# Patient Record
Sex: FEMALE | Race: WHITE | NOT HISPANIC OR LATINO | Employment: FULL TIME | URBAN - METROPOLITAN AREA
[De-identification: names, ages, dates, MRNs, and addresses within clinical notes are randomized per-mention and may not be internally consistent; named-entity substitution may affect disease eponyms.]

---

## 2023-11-30 ENCOUNTER — PHARMACY VISIT (OUTPATIENT)
Dept: PHARMACY | Facility: CLINIC | Age: 53
End: 2023-11-30
Payer: COMMERCIAL

## 2023-11-30 PROCEDURE — RXMED WILLOW AMBULATORY MEDICATION CHARGE

## 2024-01-18 ENCOUNTER — APPOINTMENT (OUTPATIENT)
Dept: UROLOGY | Facility: CLINIC | Age: 54
End: 2024-01-18
Payer: COMMERCIAL

## 2024-02-20 ENCOUNTER — TELEPHONE (OUTPATIENT)
Dept: PRIMARY CARE | Facility: CLINIC | Age: 54
End: 2024-02-20

## 2024-02-20 RX ORDER — VENLAFAXINE HYDROCHLORIDE 75 MG/1
75 CAPSULE, EXTENDED RELEASE ORAL DAILY
Qty: 90 CAPSULE | Refills: 3 | Status: CANCELLED | OUTPATIENT
Start: 2024-02-20 | End: 2025-02-19

## 2024-03-04 DIAGNOSIS — F32.A DEPRESSION, UNSPECIFIED DEPRESSION TYPE: ICD-10-CM

## 2024-03-04 PROCEDURE — RXMED WILLOW AMBULATORY MEDICATION CHARGE

## 2024-03-04 RX ORDER — VENLAFAXINE HYDROCHLORIDE 75 MG/1
75 CAPSULE, EXTENDED RELEASE ORAL DAILY
Qty: 90 CAPSULE | Refills: 3 | Status: SHIPPED | OUTPATIENT
Start: 2024-03-04 | End: 2024-06-05 | Stop reason: SDUPTHER

## 2024-03-05 ENCOUNTER — PHARMACY VISIT (OUTPATIENT)
Dept: PHARMACY | Facility: CLINIC | Age: 54
End: 2024-03-05
Payer: COMMERCIAL

## 2024-04-09 DIAGNOSIS — Z00.6 RESEARCH STUDY PATIENT: Primary | ICD-10-CM

## 2024-04-16 ENCOUNTER — APPOINTMENT (OUTPATIENT)
Dept: LAB | Facility: LAB | Age: 54
End: 2024-04-16
Payer: COMMERCIAL

## 2024-05-16 ENCOUNTER — TELEMEDICINE (OUTPATIENT)
Dept: PRIMARY CARE | Facility: CLINIC | Age: 54
End: 2024-05-16
Payer: COMMERCIAL

## 2024-05-16 DIAGNOSIS — M54.2 NECK PAIN: Primary | ICD-10-CM

## 2024-05-16 PROCEDURE — 99213 OFFICE O/P EST LOW 20 MIN: CPT | Performed by: INTERNAL MEDICINE

## 2024-05-16 PROCEDURE — 1036F TOBACCO NON-USER: CPT | Performed by: INTERNAL MEDICINE

## 2024-05-16 RX ORDER — NAPROXEN 500 MG/1
TABLET ORAL
Qty: 60 TABLET | Refills: 0 | Status: SHIPPED | OUTPATIENT
Start: 2024-05-16 | End: 2024-06-05 | Stop reason: WASHOUT

## 2024-05-16 RX ORDER — ACETAMINOPHEN 500 MG
1 TABLET ORAL DAILY
COMMUNITY

## 2024-05-16 RX ORDER — ESTRADIOL 0.1 MG/G
2 CREAM VAGINAL 3 TIMES WEEKLY
COMMUNITY
Start: 2022-05-20

## 2024-05-16 RX ORDER — CYCLOBENZAPRINE HCL 5 MG
TABLET ORAL
Qty: 30 TABLET | Refills: 0 | Status: SHIPPED | OUTPATIENT
Start: 2024-05-16 | End: 2024-06-05 | Stop reason: WASHOUT

## 2024-05-16 RX ORDER — MAGNESIUM GLUCONATE 27.5 (500)
1 TABLET ORAL DAILY
COMMUNITY

## 2024-05-16 RX ORDER — MAGNESIUM 200 MG
1 TABLET ORAL DAILY
COMMUNITY
Start: 2021-10-19

## 2024-05-16 RX ORDER — FLUTICASONE PROPIONATE 50 MCG
1 SPRAY, SUSPENSION (ML) NASAL DAILY
COMMUNITY
Start: 2020-12-30 | End: 2024-06-05 | Stop reason: SDUPTHER

## 2024-05-16 RX ORDER — IBUPROFEN 100 MG/5ML
1 SUSPENSION, ORAL (FINAL DOSE FORM) ORAL DAILY
COMMUNITY

## 2024-05-16 RX ORDER — CLOBETASOL PROPIONATE 0.5 MG/G
OINTMENT TOPICAL 2 TIMES DAILY
COMMUNITY
Start: 2022-05-25 | End: 2024-06-05 | Stop reason: SDUPTHER

## 2024-05-16 NOTE — PATIENT INSTRUCTIONS
Good to speak with you by phone today  Unclear exactly what is going on with your neck.  To me sounds like musculoskeletal in nature.    Lets start with a plain film of the neck.  Trial of naproxen 500 mg BID x 5 days (more like every 12 hours) plus cyclobenzaprine 5-10 mg only at bedtime as too sedating.    Referral to PT.  I like the [facility] for [facility].    If you are not improving would like to examine in person on 06/05/24 at [place] at 8:30 AM

## 2024-05-16 NOTE — PROGRESS NOTES
Subjective   Patient ID:   [id number]   [id number]   Radha Tellez is a 53 y.o. female who presents for urgent phone visit.  Follow-up (Patient having pain in neck since Dec and not getting better).  HPI    53 year old female noting in December she was watching a movie and fell asleep with her neck in a weird position. She used heat, NSAIDS and she still has pain turning her head to the left. If she keeps her neck neutral it does not hurt. She denies any neurologic symptoms.  She has not had any headaches.  No weakness. No numbness.  Using ibuprofen 2 every few days with relief.    ROS were reviewed and are negative with the exception of what is noted in HPI    There were no vitals taken for this visit.  Objective   Physical Exam  Phone call so did not see her.  Speech fluent.      Assessment/Plan   Problem List Items Addressed This Visit    None  #neck pain- unclear exactly but from her description sounds more like muscular in nature. A bit of a challenge with a phone call although she is an amazing historian.  She describes the pain along the posterior cervical spine around C4 level and there is some tenderness lateral to the actual spine in the left side.  Will start with plain film, trial of naproxen 500 mg BID x 5 days and cyclobenzaprine 5-10 mg at bedtime.  Also referral to [facility] physical therapy to help with strain.  If she is not better would like to see her in person to clarify her exam further. We agree to see each other Wednesday June 5th at 8:30 AM.     NOT ADDRESSED TODAY  1. Breast cancer - originally diagnosed 2005 s/p adriamycin and cytoxan chemotherapy. s/p mastectomy bilaterally. BRCA negative . Follows up with survivor clinic, [facility].   2. 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 Depression, anxiety - stable on Effexor 75 mg BID. No changes at this time. LEngthy discussion  today about her coping strategies and she is going back to therapist for support and ideas.   4. Osteopenia, osteoporosis - alendronate started 2013, repeat bone densitometry December 2015 with slight progression. Consider future referral to endocrine. Stopped alendronate 5/18 as has completed 5 years. Encourage healthy lifestyle. Bone density 11//21 showed improvement in density. Repeat 11/23. COntinue good habits.   5. Palpitation episode - infrequent and feels like she can valsalva and it goes away.   6. Allergic Rhinitis -nasal steroids and antihistamines PRN. Consider Haig T in the future. This is a major issue and made worse by the dog. Fluticasone used regularly.   7. Vaginal bleeding and urine symptoms, urethral caruncle and lichen sclerosis identified by GYN - unable to differentiate if vaginal or urethral, appreciate GYN input, s/p hysterectomy.  8. Nephrolithiasis   9. Moles - derm told her moles do not look worrisome so annual visit unnecessary   10. Malodorous stools - still an issue but takes omega fish oil  11. Burp - still with burping  12. Hair thinning - no clear source and she suspects related to aging and hormone changes.   13. Vulvovaginal atrophy - okay with KY jelly. Vagifem did help but generic version with unpleasant odor so stopped and has not been as bad.   14. Wrist fracture - overall better  15. Health maintenance - she has had SHARON and BSO, current immunizations, shingrix vaccination series completed, colonoscopy due 2024, goes to cancer survivor clinic, Had COVID vaccinations and COVID in 4/22   Mikaela Rudolph MD

## 2024-05-28 PROCEDURE — RXMED WILLOW AMBULATORY MEDICATION CHARGE

## 2024-05-31 ENCOUNTER — PHARMACY VISIT (OUTPATIENT)
Dept: PHARMACY | Facility: CLINIC | Age: 54
End: 2024-05-31
Payer: COMMERCIAL

## 2024-06-05 ENCOUNTER — OFFICE VISIT (OUTPATIENT)
Dept: PRIMARY CARE | Facility: CLINIC | Age: 54
End: 2024-06-05
Payer: COMMERCIAL

## 2024-06-05 ENCOUNTER — LAB (OUTPATIENT)
Dept: LAB | Facility: LAB | Age: 54
End: 2024-06-05
Payer: COMMERCIAL

## 2024-06-05 ENCOUNTER — PHARMACY VISIT (OUTPATIENT)
Dept: PHARMACY | Facility: CLINIC | Age: 54
End: 2024-06-05
Payer: COMMERCIAL

## 2024-06-05 VITALS
SYSTOLIC BLOOD PRESSURE: 100 MMHG | BODY MASS INDEX: 21.77 KG/M2 | DIASTOLIC BLOOD PRESSURE: 64 MMHG | WEIGHT: 139 LBS | HEART RATE: 66 BPM

## 2024-06-05 DIAGNOSIS — C50.919 MALIGNANT NEOPLASM OF FEMALE BREAST, UNSPECIFIED ESTROGEN RECEPTOR STATUS, UNSPECIFIED LATERALITY, UNSPECIFIED SITE OF BREAST (MULTI): ICD-10-CM

## 2024-06-05 DIAGNOSIS — M81.0 OSTEOPOROSIS WITHOUT CURRENT PATHOLOGICAL FRACTURE, UNSPECIFIED OSTEOPOROSIS TYPE: ICD-10-CM

## 2024-06-05 DIAGNOSIS — Z00.00 ANNUAL PHYSICAL EXAM: ICD-10-CM

## 2024-06-05 DIAGNOSIS — L65.9 ALOPECIA: ICD-10-CM

## 2024-06-05 DIAGNOSIS — Z78.9 VEGETARIAN: ICD-10-CM

## 2024-06-05 DIAGNOSIS — F32.A DEPRESSION, UNSPECIFIED DEPRESSION TYPE: ICD-10-CM

## 2024-06-05 DIAGNOSIS — J30.9 ALLERGIC RHINITIS, UNSPECIFIED SEASONALITY, UNSPECIFIED TRIGGER: ICD-10-CM

## 2024-06-05 DIAGNOSIS — K21.9 GASTROESOPHAGEAL REFLUX DISEASE, UNSPECIFIED WHETHER ESOPHAGITIS PRESENT: ICD-10-CM

## 2024-06-05 DIAGNOSIS — Z86.010 HISTORY OF COLONIC POLYPS: ICD-10-CM

## 2024-06-05 DIAGNOSIS — G47.00 INSOMNIA, UNSPECIFIED TYPE: Primary | ICD-10-CM

## 2024-06-05 DIAGNOSIS — C18.9 MALIGNANT NEOPLASM OF COLON, UNSPECIFIED PART OF COLON (MULTI): ICD-10-CM

## 2024-06-05 DIAGNOSIS — N90.4 LICHEN SCLEROSUS OF VULVA: ICD-10-CM

## 2024-06-05 DIAGNOSIS — Z12.11 COLON CANCER SCREENING: ICD-10-CM

## 2024-06-05 DIAGNOSIS — F41.9 ANXIETY: ICD-10-CM

## 2024-06-05 DIAGNOSIS — N95.2 VAGINAL ATROPHY: ICD-10-CM

## 2024-06-05 DIAGNOSIS — L90.0 LICHEN SCLEROSUS: ICD-10-CM

## 2024-06-05 PROBLEM — S52.501S: Status: ACTIVE | Noted: 2024-06-05

## 2024-06-05 PROBLEM — Z86.0100 HISTORY OF COLONIC POLYPS: Status: ACTIVE | Noted: 2024-06-05

## 2024-06-05 PROBLEM — H52.10 MYOPIA: Status: ACTIVE | Noted: 2024-06-05

## 2024-06-05 PROBLEM — N90.89 VULVAR IRRITATION: Status: ACTIVE | Noted: 2024-06-05

## 2024-06-05 PROBLEM — G56.10: Status: ACTIVE | Noted: 2024-06-05

## 2024-06-05 LAB
25(OH)D3 SERPL-MCNC: 46 NG/ML (ref 30–100)
ALBUMIN SERPL BCP-MCNC: 4.4 G/DL (ref 3.4–5)
ALP SERPL-CCNC: 53 U/L (ref 33–110)
ALT SERPL W P-5'-P-CCNC: 15 U/L (ref 7–45)
ANION GAP SERPL CALC-SCNC: 15 MMOL/L (ref 10–20)
AST SERPL W P-5'-P-CCNC: 23 U/L (ref 9–39)
BILIRUB SERPL-MCNC: 0.6 MG/DL (ref 0–1.2)
BUN SERPL-MCNC: 13 MG/DL (ref 6–23)
CALCIUM SERPL-MCNC: 9.5 MG/DL (ref 8.6–10.6)
CHLORIDE SERPL-SCNC: 101 MMOL/L (ref 98–107)
CHOLEST SERPL-MCNC: 180 MG/DL (ref 0–199)
CHOLESTEROL/HDL RATIO: 2.5
CO2 SERPL-SCNC: 28 MMOL/L (ref 21–32)
CREAT SERPL-MCNC: 0.83 MG/DL (ref 0.5–1.05)
EGFRCR SERPLBLD CKD-EPI 2021: 85 ML/MIN/1.73M*2
ERYTHROCYTE [DISTWIDTH] IN BLOOD BY AUTOMATED COUNT: 12.2 % (ref 11.5–14.5)
EST. AVERAGE GLUCOSE BLD GHB EST-MCNC: 103 MG/DL
GLUCOSE SERPL-MCNC: 86 MG/DL (ref 74–99)
HBA1C MFR BLD: 5.2 %
HCT VFR BLD AUTO: 38.7 % (ref 36–46)
HDLC SERPL-MCNC: 71.8 MG/DL
HGB BLD-MCNC: 12.6 G/DL (ref 12–16)
LDLC SERPL CALC-MCNC: 88 MG/DL
MCH RBC QN AUTO: 29.6 PG (ref 26–34)
MCHC RBC AUTO-ENTMCNC: 32.6 G/DL (ref 32–36)
MCV RBC AUTO: 91 FL (ref 80–100)
NON HDL CHOLESTEROL: 108 MG/DL (ref 0–149)
NRBC BLD-RTO: 0 /100 WBCS (ref 0–0)
PLATELET # BLD AUTO: 307 X10*3/UL (ref 150–450)
POTASSIUM SERPL-SCNC: 4.8 MMOL/L (ref 3.5–5.3)
PROT SERPL-MCNC: 7.1 G/DL (ref 6.4–8.2)
RBC # BLD AUTO: 4.25 X10*6/UL (ref 4–5.2)
SODIUM SERPL-SCNC: 139 MMOL/L (ref 136–145)
TRIGL SERPL-MCNC: 102 MG/DL (ref 0–149)
TSH SERPL-ACNC: 3.97 MIU/L (ref 0.44–3.98)
VIT B12 SERPL-MCNC: 731 PG/ML (ref 211–911)
VLDL: 20 MG/DL (ref 0–40)
WBC # BLD AUTO: 5.3 X10*3/UL (ref 4.4–11.3)

## 2024-06-05 PROCEDURE — 82306 VITAMIN D 25 HYDROXY: CPT

## 2024-06-05 PROCEDURE — 80053 COMPREHEN METABOLIC PANEL: CPT

## 2024-06-05 PROCEDURE — 82607 VITAMIN B-12: CPT

## 2024-06-05 PROCEDURE — RXMED WILLOW AMBULATORY MEDICATION CHARGE

## 2024-06-05 PROCEDURE — 84443 ASSAY THYROID STIM HORMONE: CPT

## 2024-06-05 PROCEDURE — 80061 LIPID PANEL: CPT

## 2024-06-05 PROCEDURE — 83036 HEMOGLOBIN GLYCOSYLATED A1C: CPT

## 2024-06-05 PROCEDURE — 85027 COMPLETE CBC AUTOMATED: CPT

## 2024-06-05 RX ORDER — ZOLPIDEM TARTRATE 5 MG/1
TABLET ORAL
Qty: 20 TABLET | Refills: 2 | Status: SHIPPED | OUTPATIENT
Start: 2024-06-05

## 2024-06-05 RX ORDER — CLOBETASOL PROPIONATE 0.5 MG/G
OINTMENT TOPICAL 2 TIMES DAILY
Qty: 60 G | Refills: 3 | Status: SHIPPED | OUTPATIENT
Start: 2024-06-05 | End: 2025-06-05

## 2024-06-05 RX ORDER — OMEPRAZOLE 20 MG/1
CAPSULE, DELAYED RELEASE ORAL
Qty: 90 CAPSULE | Refills: 1 | Status: SHIPPED | OUTPATIENT
Start: 2024-06-05

## 2024-06-05 RX ORDER — FLUTICASONE PROPIONATE 50 MCG
1 SPRAY, SUSPENSION (ML) NASAL DAILY
Qty: 16 G | Refills: 11 | Status: SHIPPED | OUTPATIENT
Start: 2024-06-05

## 2024-06-05 RX ORDER — VENLAFAXINE HYDROCHLORIDE 75 MG/1
75 CAPSULE, EXTENDED RELEASE ORAL DAILY
Qty: 90 CAPSULE | Refills: 3 | Status: SHIPPED | OUTPATIENT
Start: 2024-06-05 | End: 2025-06-05

## 2024-06-05 NOTE — PATIENT INSTRUCTIONS
Good to see you today  Glad that the neck is better.  Thanks to your friend  We renewed your meds  We ordered colonoscopy with Rox[facility] and endoscopy given your longstanding reflux.  For the reflux please try a full 3 month course of daily omeprazole in the morning.  You can use TUMS for breakthrough discomfort  For the insomnia we discussed CBT-I and my theory that you sometimes need to help the brain reset with sleep so gave you a round of zolpidem to use 5 nights in a row to help reset the sleep.  Hopefully the combination works.  Also gave you an article with resources to find CBTi.  Let me know please how this all works.   On exam you have healing poison ivy on the arm, chest and did not feel a node and do not think it was a node as they should not be there.   Referral back to [facility] for her to follow you in survivor clinic  For the osteoporosis we ordered another dexa scan and sent you to see Dr. Leach who is a budding expert on osteoporosis.    We did some review about lichen sclerosis and did see that you should have annual exam.  I checked today but plan to follow with Dr. Nye in the future.    Lets touch base in 6 months for a followup to make sure everything is in place.

## 2024-06-05 NOTE — PROGRESS NOTES
Subjective   Patient ID:   [id number]   [id number]   Radha Tellez is a 53 y.o. female who presents for No chief complaint on file..  HPI  53 year old female here for followup of neck issue and annual visit.  She notes her neck strain was muscular in nature. She saw a friend who is a therapist and they gave her exercises and it went away.  She has good ROM. She notes that her current issue is her insomnia.  She walks for exercise.  Her most challenging problem is sleep.  Lifelong problems.  She broke her toe again but it is better.  She had poison ivy which is getting better.  She also had it on her chest and had a lymph node that developed.  She saw Gena Nye who diagnosed her with lichen sclerosis and prescribed her clobetasol and she improved.  She is in remission and needs her maintenance twice weekly dose refilled. Time to see her survival clinic breast nurse practitioner.  Time for colonoscopy.      ROS were reviewed and are negative with the exception of what is noted in HPI    There were no vitals taken for this visit.  Objective   Physical Exam  Constitutional:       General: She is not in acute distress.  HENT:      Head: Normocephalic and atraumatic.      Right Ear: Tympanic membrane normal.      Left Ear: Tympanic membrane normal.      Mouth/Throat:      Mouth: Mucous membranes are moist.   Eyes:      Extraocular Movements: Extraocular movements intact.      Pupils: Pupils are equal, round, and reactive to light.   Cardiovascular:      Rate and Rhythm: Normal rate and regular rhythm.      Heart sounds: No murmur heard.     No friction rub. No gallop.   Pulmonary:      Effort: Pulmonary effort is normal.      Breath sounds: No wheezing, rhonchi or rales.   Abdominal:      General: There is no distension.      Palpations: Abdomen is soft.      Tenderness: There is no abdominal tenderness. There is no guarding.   Genitourinary:     Comments: One pinpoint red lesion right vulva,  slight erythema  internally,  no white patches or lesions otherwise  Musculoskeletal:         General: No swelling.      Cervical back: Neck supple.      Comments: Slight crepitus with flexion right knee,  good distal pulses, no edema   Lymphadenopathy:      Cervical: No cervical adenopathy.   Skin:     Comments: Resolving macular rash hyperpigmented forearms, right breast   Neurological:      Mental Status: She is alert.         Assessment/Plan   Problem List Items Addressed This Visit    None    #neck pain-resolved with proper exercises provided by PT friend       #. Breast cancer - originally diagnosed 2005 ER negative , [place] 5% +,  her +, s/p adriamycin and cytoxan chemotherapy. s/p mastectomy bilaterally. BRCA negative . Follows up with survivor clinic, [facility]. Referred her back there.    #. 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 Depression, anxiety, insomnia - stable on Effexor 75 mg BID. Her insomnia has gotten worse.  Trial of CBT-I and short course of zolpidem to help her reset.   #. Osteopenia, osteoporosis - alendronate started 2013, repeat bone densitometry December 2015 with slight progression. Consider future referral to endocrine. Stopped alendronate 5/18 as has completed 5 years. Encourage healthy lifestyle. Bone density 11//21 showed improvement in density. Repeat 11/23. Another toe fracture.  Would like her to see [facility] to help her manage her bone health.    #. Palpitation episode - infrequent and feels like she can valsalva and it goes away.   # Allergic Rhinitis -nasal steroids and antihistamines PRN. Consider LEXUS Zapata in the future. This is a major issue and made worse by the dog. Fluticasone used regularly.   #. Vaginal bleeding and urine symptoms, urethral caruncle and lichen sclerosis identified by GYN -  appreciate GYN input, s/p hysterectomy, her symptoms cleared  up completely with clobetazole.  We reviewed uptodate together and happy to keep her on maintenance therapy.    #. Nephrolithiasis - big water drinker  #. Moles - derm told her moles do not look worrisome so annual visit unnecessary        #. Malodorous stools - still an issue but takes omega fish oil  #. Burp, GERD - still with burping, would like her to get EGD with colonoscopy   #. Hair thinning - no clear source and she suspects related to aging and hormone changes.     #. Vulvovaginal atrophy, lichen sclerosis - okay with KY jelly. Vagifem did help but generic version with unpleasant odor so stopped and has not been as bad.   #. Wrist fracture - overall better  #. Health maintenance   - she has had SHARON and BSO, needs annual pelvic due to lichen sclerosis  - current immunizations, shingrix vaccination series completed,   - colonoscopy due 2024, ordered   - goes to cancer survivor clinic  - physical exam today    Mikaela Rudolph MD

## 2024-06-06 DIAGNOSIS — Z85.3 PERSONAL HISTORY OF BREAST CANCER: Primary | ICD-10-CM

## 2024-07-15 NOTE — PROGRESS NOTES
Oncology Follow-Up    Radha Tellez  [id number]              Breast         AJCC Edition: 7th (AJCC), Diagnosis Date: 19-Mar-2005, IA, T1b N0 M0     Oncology History    No history exists.     Treatment Synopsis:    1. Stage IA right breast infiltrating ductal carcinoma, diagnosed in March 2005 during pregnancy. She was treated with partial mastectomy, showing  grade 3, 1 cm invasive cancer with 0 of 4 sentinel lymph nodes. Tumor was ER/TX negative and HER-2 amplified, margins were not cleared, and therefore, she elected for right mastectomy showing a residual 3.7 cm of high-grade ductal carcinoma in situ without  residual invasive component, one intramammary lymph node was negative, T1b, N0, M0.   2. Four cycles of Adriamycin and Cytoxan, completed July 2005. She delivered her child on August 31, 2005, and breast-fed for 6 weeks before initiating 1 year of Herceptin, completed October 2005 through October 2006.   3. Genetic testing for BRCA1 and BRCA2 was negative in 2010.  4. Tamoxifen initiated in October 2006, course was not completed.   5. Left prophylactic mastectomy in January 2007 with benign histology. Bilateral silicone implants were placed.   6. Iron-deficiency anemia noted summer 2007. Colonoscopy showed tubular adenocarcinoma and she underwent right hemicolectomy on September 28, 2010. She had total abdominal hysterectomy and bilateral salpingo-oophorectomy. Hospital course was complicated  by postoperative hemorrhage.   7. Tamoxifen discontinued after oophorectomy with close monitoring and followup only.  8. Additional genetic testing for BRCA (DANIELLE) mutation and other hereditary markers was negative.       Subjective    Radha presents today for her Oncology Follow Up Visit. She was last seen in November 2021. Radha states that she feels well. She is in today as Dr. Rudolph asked her to follow up with oncology. 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 Her daughters are now 22 and 19. She ask  about any follow up that they will need as she was diagnosed with cancer at age 34. She also asks about her implants. She states that she doesn't feel any change and wonders when/if she will need them replaced. Radha         Objective      Vitals:    07/16/24 0943   BP: 91/52   Pulse: 79   Resp: 16   Temp: 36.3 °C (97.3 °F)   SpO2: 95%        Constitutional: Well developed, alert/oriented x3, no distress, cooperative   Eyes: clear sclera   ENMT: mucous membranes moist, no apparent lesions   Head/Neck: Neck supple, no bruits   Respiratory/Thorax: Patent airways, normal breath sounds with good chest expansion   Cardiovascular: Regular rate and rhythm, no murmurs, 2+ equal pulses of the extremities,   Gastrointestinal: Nondistended, soft, non-tender, no masses palpable, no organomegaly   Musculoskeletal: ROM intact, no joint swelling, normal strength   Extremities: normal extremities, no edema, cyanosis, contusions or wounds   Neurological: alert and oriented x3,  normal strength   Breast: Bilateral mastectomy with silicone reconstruction; no masses, nodules, skin changes, discharge. Left axillary incision well healed.   Lymphatic: No significant lymphadenopathy   Psychological: Appropriate mood and behavior   Skin: Warm and dry, no lesions, no rashes      Physical Exam     Lab Results   Component Value Date    WBC 5.3 06/05/2024    HGB 12.6 06/05/2024    HCT 38.7 06/05/2024    MCV 91 06/05/2024     06/05/2024       Chemistry    Lab Results   Component Value Date/Time     06/05/2024 1047    K 4.8 06/05/2024 1047     06/05/2024 1047    CO2 28 06/05/2024 1047    BUN 13 06/05/2024 1047    CREATININE 0.83 06/05/2024 1047    Lab Results   Component Value Date/Time    CALCIUM 9.5 06/05/2024 1047    ALKPHOS 53 06/05/2024 1047    AST 23 06/05/2024 1047    ALT 15 06/05/2024 1047    BILITOT 0.6 06/05/2024 1047           Imaging:  N/A      Assessment/Plan    Radha is a 52 yo woman with a remote hx of T1bN0 IDC  and 3.7cm high grade DCIS diagnosed March 2005. She is s/p bilateral mastectomy, ACTH chemotherapy, and did not complete tamoxifen course. She was diagnosed with adenocarcinoma of the colon and underwent a yamile-colectomy, SHARON/BSO. Genetics negative x2. There is no evidence of recurrent disease on today's exam.     Plan:  Exam is negative.  Discussed implant replacement and MRI for implant integrity. Radha prefers to hold off on replacement and consult with plastics. She prefers to have the implant integrity MRI first. She will schedule that. Her last MRI was in 2012.  Referred to genetics for consideration of additional testing.  Daughters referred to high risk breast clinic.  Encouraged monthly breast self exams, plant based diet, keep alcohol <3 drinks/week, exercise at least 2.5 hours/week.  We reviewed signs/symptoms of recurrence including new masses, new pigmented lesion, tugging or pulling of the skin, nipple discharge, rash in or around the chest area, or any new finding that doesn't resolve within a 2-3 weeks.  All of Radha's questions/concerns were addressed.  Over 30 minutes of time was spent with this patient with >50% of the time with education, counseling, and coordination of care.   I will see Radha on an as needed basis. She is aware to call anytime in the future should she have concerns.        Diagnoses and all orders for this visit:  History of left breast cancer  -     Referral to Genetics; Future  -     MR breast blateral wo IV contrast for implant integrity; Future  History of antineoplastic chemotherapy  -     Referral to Genetics; Future  -     MR breast blateral wo IV contrast for implant integrity; Future  History of tamoxifen therapy  Malignant neoplasm of colon, unspecified part of colon (Multi)  -     Referral to Hematology and Oncology  Malignant neoplasm of female breast, unspecified estrogen receptor status, unspecified laterality, unspecified site of breast (Multi)  -     Referral to  Hematology and Oncology  Personal history of breast cancer  -     Clinic Appointment Request New Patient; BEVERLEY VIEYRA, APRN-CNP

## 2024-07-16 ENCOUNTER — OFFICE VISIT (OUTPATIENT)
Dept: HEMATOLOGY/ONCOLOGY | Facility: HOSPITAL | Age: 54
End: 2024-07-16
Payer: COMMERCIAL

## 2024-07-16 VITALS
SYSTOLIC BLOOD PRESSURE: 91 MMHG | DIASTOLIC BLOOD PRESSURE: 52 MMHG | RESPIRATION RATE: 16 BRPM | BODY MASS INDEX: 22.08 KG/M2 | WEIGHT: 140.65 LBS | OXYGEN SATURATION: 95 % | HEART RATE: 79 BPM | HEIGHT: 67 IN | TEMPERATURE: 97.3 F

## 2024-07-16 DIAGNOSIS — C50.919 MALIGNANT NEOPLASM OF FEMALE BREAST, UNSPECIFIED ESTROGEN RECEPTOR STATUS, UNSPECIFIED LATERALITY, UNSPECIFIED SITE OF BREAST (MULTI): ICD-10-CM

## 2024-07-16 DIAGNOSIS — Z92.21 HISTORY OF ANTINEOPLASTIC CHEMOTHERAPY: ICD-10-CM

## 2024-07-16 DIAGNOSIS — Z85.3 HISTORY OF LEFT BREAST CANCER: Primary | ICD-10-CM

## 2024-07-16 DIAGNOSIS — Z85.3 PERSONAL HISTORY OF BREAST CANCER: ICD-10-CM

## 2024-07-16 DIAGNOSIS — C18.9 MALIGNANT NEOPLASM OF COLON, UNSPECIFIED PART OF COLON (MULTI): ICD-10-CM

## 2024-07-16 DIAGNOSIS — Z92.29 HISTORY OF TAMOXIFEN THERAPY: ICD-10-CM

## 2024-07-16 PROCEDURE — 99215 OFFICE O/P EST HI 40 MIN: CPT | Performed by: NURSE PRACTITIONER

## 2024-07-16 ASSESSMENT — PAIN SCALES - GENERAL: PAINLEVEL: 0-NO PAIN

## 2024-07-16 NOTE — PATIENT INSTRUCTIONS
"1. Exercise 2.5 hours per week; bone strengthening, cardio-vascular, resistance training.  2. Please do self breast exams monthly.  3. Keep alcohol under 3 drinks per week.  4. Sun safety - limit sun exposure from 11a-2p when its at its hottest, apply 15-30 sun block and re-apply every 1-2 hours if perspiring or swimming.  5. Eat a plant based diet, add in oily fishes such as mackerel, tuna, and salmon.  6. Get in at least 1,000 mg of calcium per day through diet or supplement for bone strength. Examples of foods higher in calcium are milk, yogurt, fruited yogurt, oranges, fortified orange juice, almonds, almond milk, broccoli, spinach, bok kathryn, mustard greens, puddings, custards, ice cream, fortified cereals, bars, and crackers.   7. I will place a referral to genetics for possible testing.   8. For the High Risk Breast Clinic please call [phone] ([phone]). There are several Nurse Practitioners who see high risk patients depending on the location. Radha Mckinnon NP and Shae Roach NP are at [facility], Nat Mena NP, Shae Roach, and Radha Orellana are at [facility].   9. Your last Breast MRI was on 2012. I recommend that you have another \"rupture protocol\" MRI. You have had ultrasounds with the last being in 2022.  10. Please call the office if any new mass or rash in or around breast, or any uncontrolled symptoms that last over 2-3 weeks at [phone].  11. It was nice seeing you today, Radha! I will see you back on an as needed basis. Please don't hesitate to call with any concerns in the future at [phone]   It has been my privilege to have been your oncology provider. I wish you the very best!     "

## 2024-08-02 ENCOUNTER — ANESTHESIA EVENT (OUTPATIENT)
Dept: GASTROENTEROLOGY | Facility: HOSPITAL | Age: 54
End: 2024-08-02
Payer: COMMERCIAL

## 2024-08-02 RX ORDER — MEPERIDINE HYDROCHLORIDE 25 MG/ML
12.5 INJECTION INTRAMUSCULAR; INTRAVENOUS; SUBCUTANEOUS EVERY 10 MIN PRN
Status: CANCELLED | OUTPATIENT
Start: 2024-08-02

## 2024-08-02 RX ORDER — LIDOCAINE HYDROCHLORIDE 10 MG/ML
0.1 INJECTION, SOLUTION EPIDURAL; INFILTRATION; INTRACAUDAL; PERINEURAL ONCE
Status: CANCELLED | OUTPATIENT
Start: 2024-08-02 | End: 2024-08-02

## 2024-08-02 RX ORDER — OXYCODONE HYDROCHLORIDE 5 MG/1
5 TABLET ORAL EVERY 4 HOURS PRN
Status: CANCELLED | OUTPATIENT
Start: 2024-08-02

## 2024-08-02 RX ORDER — SODIUM CHLORIDE, SODIUM LACTATE, POTASSIUM CHLORIDE, CALCIUM CHLORIDE 600; 310; 30; 20 MG/100ML; MG/100ML; MG/100ML; MG/100ML
100 INJECTION, SOLUTION INTRAVENOUS CONTINUOUS
Status: CANCELLED | OUTPATIENT
Start: 2024-08-02

## 2024-08-02 RX ORDER — ONDANSETRON HYDROCHLORIDE 2 MG/ML
4 INJECTION, SOLUTION INTRAVENOUS ONCE AS NEEDED
Status: CANCELLED | OUTPATIENT
Start: 2024-08-02

## 2024-08-05 ENCOUNTER — ANESTHESIA (OUTPATIENT)
Dept: GASTROENTEROLOGY | Facility: HOSPITAL | Age: 54
End: 2024-08-05
Payer: COMMERCIAL

## 2024-08-05 ENCOUNTER — HOSPITAL ENCOUNTER (OUTPATIENT)
Dept: GASTROENTEROLOGY | Facility: HOSPITAL | Age: 54
Setting detail: OUTPATIENT SURGERY
Discharge: HOME | End: 2024-08-05
Payer: COMMERCIAL

## 2024-08-05 VITALS
HEART RATE: 53 BPM | RESPIRATION RATE: 12 BRPM | DIASTOLIC BLOOD PRESSURE: 53 MMHG | HEIGHT: 67 IN | OXYGEN SATURATION: 100 % | SYSTOLIC BLOOD PRESSURE: 93 MMHG | BODY MASS INDEX: 21.42 KG/M2 | TEMPERATURE: 97 F | WEIGHT: 136.47 LBS

## 2024-08-05 DIAGNOSIS — Z12.11 COLON CANCER SCREENING: ICD-10-CM

## 2024-08-05 DIAGNOSIS — C18.9 MALIGNANT NEOPLASM OF COLON, UNSPECIFIED PART OF COLON (MULTI): ICD-10-CM

## 2024-08-05 DIAGNOSIS — K21.9 GASTROESOPHAGEAL REFLUX DISEASE, UNSPECIFIED WHETHER ESOPHAGITIS PRESENT: ICD-10-CM

## 2024-08-05 PROBLEM — F32.A DEPRESSION: Status: ACTIVE | Noted: 2024-08-05

## 2024-08-05 PROBLEM — C26.9 GI CANCER (MULTI): Status: ACTIVE | Noted: 2024-08-05

## 2024-08-05 PROBLEM — J45.909 ASTHMA (HHS-HCC): Status: ACTIVE | Noted: 2024-08-05

## 2024-08-05 PROBLEM — F41.9 ANXIETY: Status: ACTIVE | Noted: 2024-08-05

## 2024-08-05 PROCEDURE — 3700000002 HC GENERAL ANESTHESIA TIME - EACH INCREMENTAL 1 MINUTE

## 2024-08-05 PROCEDURE — G0105 COLORECTAL SCRN; HI RISK IND: HCPCS | Performed by: INTERNAL MEDICINE

## 2024-08-05 PROCEDURE — 43235 EGD DIAGNOSTIC BRUSH WASH: CPT | Performed by: INTERNAL MEDICINE

## 2024-08-05 PROCEDURE — 2500000004 HC RX 250 GENERAL PHARMACY W/ HCPCS (ALT 636 FOR OP/ED): Performed by: ANESTHESIOLOGIST ASSISTANT

## 2024-08-05 PROCEDURE — 3700000001 HC GENERAL ANESTHESIA TIME - INITIAL BASE CHARGE

## 2024-08-05 PROCEDURE — 2500000005 HC RX 250 GENERAL PHARMACY W/O HCPCS: Performed by: ANESTHESIOLOGIST ASSISTANT

## 2024-08-05 PROCEDURE — 7100000009 HC PHASE TWO TIME - INITIAL BASE CHARGE

## 2024-08-05 PROCEDURE — 7100000010 HC PHASE TWO TIME - EACH INCREMENTAL 1 MINUTE

## 2024-08-05 RX ORDER — MIDAZOLAM HYDROCHLORIDE 1 MG/ML
INJECTION INTRAMUSCULAR; INTRAVENOUS AS NEEDED
Status: DISCONTINUED | OUTPATIENT
Start: 2024-08-05 | End: 2024-08-05

## 2024-08-05 RX ORDER — PHENYLEPHRINE HCL IN 0.9% NACL 1 MG/10 ML
SYRINGE (ML) INTRAVENOUS AS NEEDED
Status: DISCONTINUED | OUTPATIENT
Start: 2024-08-05 | End: 2024-08-05

## 2024-08-05 RX ORDER — PROPOFOL 10 MG/ML
INJECTION, EMULSION INTRAVENOUS AS NEEDED
Status: DISCONTINUED | OUTPATIENT
Start: 2024-08-05 | End: 2024-08-05

## 2024-08-05 RX ORDER — LIDOCAINE HYDROCHLORIDE 20 MG/ML
INJECTION, SOLUTION EPIDURAL; INFILTRATION; INTRACAUDAL; PERINEURAL AS NEEDED
Status: DISCONTINUED | OUTPATIENT
Start: 2024-08-05 | End: 2024-08-05

## 2024-08-05 RX ORDER — FENTANYL CITRATE 50 UG/ML
INJECTION, SOLUTION INTRAMUSCULAR; INTRAVENOUS AS NEEDED
Status: DISCONTINUED | OUTPATIENT
Start: 2024-08-05 | End: 2024-08-05

## 2024-08-05 ASSESSMENT — PAIN - FUNCTIONAL ASSESSMENT
PAIN_FUNCTIONAL_ASSESSMENT: 0-10

## 2024-08-05 ASSESSMENT — PAIN SCALES - GENERAL
PAINLEVEL_OUTOF10: 0 - NO PAIN
PAIN_LEVEL: 0
PAINLEVEL_OUTOF10: 0 - NO PAIN

## 2024-08-05 ASSESSMENT — COLUMBIA-SUICIDE SEVERITY RATING SCALE - C-SSRS
6. HAVE YOU EVER DONE ANYTHING, STARTED TO DO ANYTHING, OR PREPARED TO DO ANYTHING TO END YOUR LIFE?: NO
1. IN THE PAST MONTH, HAVE YOU WISHED YOU WERE DEAD OR WISHED YOU COULD GO TO SLEEP AND NOT WAKE UP?: NO
2. HAVE YOU ACTUALLY HAD ANY THOUGHTS OF KILLING YOURSELF?: NO

## 2024-08-05 NOTE — H&P
"Subjective     History of Present Illness:   Radha Tellez \"Tanya\" is a 54 y.o. female who presents to endoscopy    Physical Exam  General: not in acute distress  CV: regular rate and rhythm  Resp: non-labored breathing   "

## 2024-08-05 NOTE — ANESTHESIA POSTPROCEDURE EVALUATION
"Patient: Radha Tellez \"Tanya\"    Procedure Summary       Date: 08/05/24 Room / Location: [facility]    Anesthesia Start: 1020 Anesthesia Stop: 1051    Procedures:       COLONOSCOPY      EGD Diagnosis:       Malignant neoplasm of colon, unspecified part of colon (Multi)      Colon cancer screening      Gastroesophageal reflux disease, unspecified whether esophagitis present    Scheduled Providers: William Rosales MD; Chandra Aguirre MD Responsible Provider: Chandra Aguirre MD    Anesthesia Type: MAC ASA Status: 2            Anesthesia Type: MAC    Vitals Value Taken Time   BP 78/50 08/05/24 1119   Temp 36.1 °C (97 °F) 08/05/24 1046   Pulse 67 08/05/24 1121   Resp 12 08/05/24 1116   SpO2 100 % 08/05/24 1119   Vitals shown include unfiled device data.    Anesthesia Post Evaluation    Patient location during evaluation: bedside  Patient participation: complete - patient cannot participate  Level of consciousness: awake  Pain score: 0  Pain management: adequate  Airway patency: patent  Cardiovascular status: acceptable  Respiratory status: acceptable  Hydration status: acceptable  Postoperative Nausea and Vomiting: none      Hydrating in PACU, expect BP to increase.  No notable events documented.    "

## 2024-08-05 NOTE — ANESTHESIA PREPROCEDURE EVALUATION
"Patient: Radha Tellez \"Tanya\"    Procedure Information       Date/Time: 08/05/24 1040    Scheduled providers: William Rosales MD; Chandra Aguirre MD    Procedures:       COLONOSCOPY      EGD    Location: [facility]            Relevant Problems   Anesthesia (within normal limits)      Cardiac (within normal limits)      Pulmonary  Env allergies   (+) Asthma (HHS-HCC) (remote)      Neuro  insomnia   (+) Anxiety   (+) Depression   (+) Other lesions of median nerve, unspecified upper limb      GI   (+) GI cancer (Multi)   (+) Gastroesophageal reflux disease      /Renal (within normal limits)      Liver   (+) GI cancer (Multi)      Hematology (within normal limits)       Clinical information reviewed:   Tobacco  Allergies  Meds   Med Hx  Surg Hx   Fam Hx  Soc Hx        NPO Detail:  NPO/Void Status  Date of Last Liquid: 08/05/24  Time of Last Liquid: 0500  Date of Last Solid: 08/04/24  Time of Last Solid: 0800         Physical Exam    Airway  Mallampati: I     Cardiovascular    Dental        Pulmonary    Abdominal            Anesthesia Plan    History of general anesthesia?: yes  History of complications of general anesthesia?: no    ASA 2     MAC     intravenous induction   Anesthetic plan and risks discussed with patient.      "

## 2024-08-13 ENCOUNTER — HOSPITAL ENCOUNTER (OUTPATIENT)
Dept: RADIOLOGY | Facility: HOSPITAL | Age: 54
Discharge: HOME | End: 2024-08-13
Payer: COMMERCIAL

## 2024-08-13 DIAGNOSIS — Z92.21 HISTORY OF ANTINEOPLASTIC CHEMOTHERAPY: ICD-10-CM

## 2024-08-13 DIAGNOSIS — Z85.3 HISTORY OF LEFT BREAST CANCER: ICD-10-CM

## 2024-08-13 PROCEDURE — 77047 MRI BREAST C- BILATERAL: CPT | Mod: 50

## 2024-08-14 ENCOUNTER — TELEPHONE (OUTPATIENT)
Dept: HEMATOLOGY/ONCOLOGY | Facility: CLINIC | Age: 54
End: 2024-08-14
Payer: COMMERCIAL

## 2024-08-14 DIAGNOSIS — T85.43XA BREAST IMPLANT RUPTURE, INITIAL ENCOUNTER: Primary | ICD-10-CM

## 2024-08-14 NOTE — TELEPHONE ENCOUNTER
Spoke with Radha about her recent implant rupture protocol MRI. This did show bilateral intracapsular ruptures. Explained that this is still contained within the implants and are not extracapsular which then would mix with organs/blood stream. I will refer Radha to Dr. Bella Chavez in breast plastic surgery for surgical consultation. Randy

## 2024-08-27 PROCEDURE — RXMED WILLOW AMBULATORY MEDICATION CHARGE

## 2024-09-05 ENCOUNTER — PHARMACY VISIT (OUTPATIENT)
Dept: PHARMACY | Facility: CLINIC | Age: 54
End: 2024-09-05
Payer: COMMERCIAL

## 2024-10-12 NOTE — PROGRESS NOTES
Plastic Surgery Clinic Visit  Breast Reconstruction    Patient Name: Radha Tellez  MRN: [id number]  Date:  10/15/24     Subjective  Radha Tellez is a 54 y.o. female with a remote hx of T1bN0 IDC and 3.7cm high grade DCIS diagnosed March 2005. She is s/p bilateral mastectomy with bilateral silicone implants in 2007.  ACTH chemotherapy, and did not complete tamoxifen course. She was diagnosed with adenocarcinoma of the colon and underwent a yamile-colectomy, SHARON/BSO. She has bilateral intracapsular rupture per MRI in August.  She is referred by Aurora Rios CNP for breast reconstruction options.    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  She does have several commitments already scheduled for the fall and winter due to her daughter's competitive skating schedule.  Her daughter has started college at  this fall.      Past Medical History:   Diagnosis Date    Acute cystitis with hematuria 10/08/2019    Hematuria due to acute cystitis    Breast cancer (Multi)     Colon cancer (Multi)     Encounter for gynecological examination (general) (routine) without abnormal findings 05/20/2022    Pap smear, as part of routine gynecological examination    Other conditions influencing health status 09/24/2013    Median Nerve Palsy    Other osteoporosis without current pathological fracture 04/21/2015    Idiopathic osteoporosis    Other specified noninflammatory disorders of vagina 10/24/2019    Vaginal mass    Other specified noninflammatory disorders of vagina 07/26/2016    Vaginal lesion    Other specified noninflammatory disorders of vulva and perineum 05/20/2022    Vulvar irritation    Pain in right forearm 03/07/2019    Pain of right forearm    Pain in unspecified hip 11/05/2013    Joint pain, hip    Pain in unspecified lower leg 09/03/2019    Pain in shin    Pain in unspecified  wrist 03/09/2019    Wrist pain    Personal history of colonic polyps     History of colon polyps    Personal history of diseases of the blood and blood-forming organs and certain disorders involving the immune mechanism 10/11/2017    History of anemia    Personal history of other diseases of the female genital tract 04/10/2019    History of vaginal bleeding    Personal history of other diseases of the musculoskeletal system and connective tissue     History of rotator cuff tear    Personal history of other diseases of the nervous system and sense organs 01/05/2021    History of myopia    Personal history of other diseases of the respiratory system 09/24/2013    History of allergic rhinitis    Personal history of other diseases of urinary system 01/29/2021    History of hematuria    Personal history of other malignant neoplasm of large intestine 10/08/2019    History of malignant neoplasm of colon    Personal history of other malignant neoplasm of large intestine 10/08/2019    Personal history of colon cancer    Personal history of other mental and behavioral disorders 11/17/2015    History of anxiety disorder    Personal history of other specified conditions 10/19/2021    History of night sweats    Personal history of other specified conditions 10/27/2019    History of urinary frequency    Personal history of other specified conditions 10/27/2019    History of urinary urgency    Unspecified fracture of lower end of unspecified ulna, initial encounter for closed fracture 08/23/2013    Closed fracture of distal end of ulna    Unspecified symptoms and signs involving the genitourinary system 05/09/2022    Lower urinary tract symptoms (LUTS)     Past Surgical History:   Procedure Laterality Date    HAND SURGERY  09/19/2013    Hand Surgery                                                                                                                                                          HEMICOLECTOMY  09/19/2013     Hemicolectomy    MASTECTOMY  09/19/2013    Breast Surgery Mastectomy    OTHER SURGICAL HISTORY  08/06/2014    Closed Treatment Of Fracture Of The Distal Radius    REFRACTIVE SURGERY  05/16/2017    Corneal LASIK    TOTAL ABDOMINAL HYSTERECTOMY W/ BILATERAL SALPINGOOPHORECTOMY  03/27/2019    Total Abdominal Hysterectomy With Removal Of Both Ovaries     No Known Allergies    Current Outpatient Medications:     ascorbic acid (Vitamin C) 1,000 mg tablet, Take 1 tablet (1,000 mg) by mouth once daily., Disp: , Rfl:     cholecalciferol (Vitamin D-3) 50 mcg (2,000 unit) capsule, Take 1 capsule (50 mcg) by mouth once daily., Disp: , Rfl:     clobetasol (Temovate) 0.05 % ointment, Apply topically 2 times a day. (Patient not taking: Reported on 8/5/2024), Disp: 60 g, Rfl: 3    cyanocobalamin, vitamin B-12, 1,000 mcg tablet, sublingual, Place 1 tablet (1,000 mcg) under the tongue once daily., Disp: , Rfl:     estradiol (Estrace) 0.01 % (0.1 mg/gram) vaginal cream, Insert 0.5 Applicatorfuls (2 g) into the vagina 3 (three) times a week., Disp: , Rfl:     fish oil concentrate (Omega-3) 120-180 mg capsule, Take 1 capsule (1 g) by mouth once daily., Disp: , Rfl:     fluticasone (Flonase) 50 mcg/actuation nasal spray, Administer 1 spray into each nostril once daily., Disp: 16 g, Rfl: 11    magnesium gluconate (Magonate) 27.5 mg magne- sium (500 mg) tablet, Take 1 tablet (27.5 mg) by mouth once daily., Disp: , Rfl:     omeprazole (PriLOSEC) 20 mg DR capsule, Take 1 daily before breakfast for 90 days.  Then stop and see if the reflux is still an issue or not.  Do not crush or chew., Disp: 90 capsule, Rfl: 1    venlafaxine XR (Effexor-XR) 75 mg 24 hr capsule, TAKE 1 CAPSULE ONCE DAILY WITH FOOD., Disp: 90 capsule, Rfl: 3    zolpidem (Ambien) 5 mg tablet, 1-2 at bedtime 5 nights in a row to help reset your sleep as you start your CBT, Disp: 20 tablet, Rfl: 2   No family history on file.  Social History     Tobacco Use    Smoking status:  "Never     Passive exposure: Never    Smokeless tobacco: Never   Vaping Use    Vaping status: Never Used   Substance Use Topics    Alcohol use: Not Currently    Drug use: Never       ROS:  ROS: All 10 systems were reviewed and are unremarkable except for those mentioned in HPI.    Objective    BP 90/61   Pulse 67   Ht 1.702 m (5' 7\")   Wt 63 kg (139 lb)   BMI 21.77 kg/m²      Physical Exam:   Constitutional: A&Ox3, calm and cooperative, NAD  Eyes: PERRL, clear sclera   ENMT: Moist mucous membranes, no apparent injuries or lesions  Head/Neck: Neck supple, no JVD  Cardiovascular: Normal rate and regular rhythm, 2+ equal pulses of the distal extremities  Respiratory/Thorax: CTAB, regular respirations on RA, good symmetric chest expansion  Gastrointestinal: Abdomen soft, non tender   Extremities: No peripheral edema  Neurological: A&Ox3  Psychological: Appropriate mood and behavior  Skin: Warm and dry with no lesions or rashes    Breast Exam:  Bra size: B    Left Breast:  NTN: 20  BW: 13  NTF: 5.5  NAC: 3  Comments: Left Breast visible IMF incision with slight bottoming out of implant    Right Breast:  NTN: 20  BW: 13  NTF: 5.5  NAC:  Comments: Right Horizontal Mastectomy Scar by Nipple, Upper pole indentation from post op fat necrosis      Implant Information:   Date of Surgery: 2007  Type of implant: Silicone  Plane: Subpectoral with animation deformity    Photos  Completed at this visit     Diagnostics   8/13/24: Bilateral intracapsular rupture of breast implants. No MRI evidence  of extra capsular rupture.    Please note that this study does not evaluate for or exclude breast  cancer as no contrast was administered.   Assessment/Plan  Radha Tellez is a 54 y.o. female with a remote hx of T1bN0 IDC and 3.7cm high grade DCIS diagnosed March 2005. She is s/p bilateral mastectomy with bilateral silicone implants in 2007.  ACTH chemotherapy, and did not complete tamoxifen course. She was diagnosed with " adenocarcinoma of the colon and underwent a yamile-colectomy, SHARON/BSO. She has bilateral intracapsular rupture per MRI in August.  She is referred by Aurora Rios CNP for breast reconstruction options.    -Radha is interested in removal of ruptured implants with capsulectomy with immediate replacement of silicone implants.  -She would like to remain a B cup  -we will proceed with fat grafting in order to help fill in her upper pole fullness as a second staged procedure  -we will raise her IMF bilaterally  -we will order 13 and 14 cm base width High Profile and Moderate plus (moderate plus 400, 450, 475 and , 550, 600) -- this may increase overall cup size from a B so will need to discuss again prior to surgery.  The wider width would help decrease sternal space.  Could leave increased sternal space and go smaller  base width  -Dates that work for [employer] are 2/6/24 or any week in March    Plan for replacement on the first week of February 2025 or the month of March 2025.    Attending Attestation:  IBella MD, personal performed the history, exam, and decision making on this patient.  A total of 45 mins were spent in patient counseling, review of medical records, and coordination of care.

## 2024-10-15 ENCOUNTER — APPOINTMENT (OUTPATIENT)
Dept: PLASTIC SURGERY | Facility: CLINIC | Age: 54
End: 2024-10-15
Payer: COMMERCIAL

## 2024-10-15 VITALS
WEIGHT: 139 LBS | HEIGHT: 67 IN | SYSTOLIC BLOOD PRESSURE: 90 MMHG | DIASTOLIC BLOOD PRESSURE: 61 MMHG | HEART RATE: 67 BPM | BODY MASS INDEX: 21.82 KG/M2

## 2024-10-15 DIAGNOSIS — T85.43XA BREAST IMPLANT RUPTURE, INITIAL ENCOUNTER: ICD-10-CM

## 2024-10-15 PROCEDURE — 99204 OFFICE O/P NEW MOD 45 MIN: CPT | Performed by: SURGERY

## 2024-10-15 PROCEDURE — 3008F BODY MASS INDEX DOCD: CPT | Performed by: SURGERY

## 2024-10-23 ENCOUNTER — APPOINTMENT (OUTPATIENT)
Dept: RHEUMATOLOGY | Facility: CLINIC | Age: 54
End: 2024-10-23
Payer: COMMERCIAL

## 2024-10-23 VITALS
HEIGHT: 68 IN | TEMPERATURE: 97.6 F | BODY MASS INDEX: 21.16 KG/M2 | SYSTOLIC BLOOD PRESSURE: 100 MMHG | DIASTOLIC BLOOD PRESSURE: 69 MMHG | HEART RATE: 77 BPM | WEIGHT: 139.6 LBS | OXYGEN SATURATION: 97 %

## 2024-10-23 DIAGNOSIS — M81.0 OSTEOPOROSIS WITHOUT CURRENT PATHOLOGICAL FRACTURE, UNSPECIFIED OSTEOPOROSIS TYPE: ICD-10-CM

## 2024-10-23 PROCEDURE — 99204 OFFICE O/P NEW MOD 45 MIN: CPT | Performed by: INTERNAL MEDICINE

## 2024-10-23 PROCEDURE — 1036F TOBACCO NON-USER: CPT | Performed by: INTERNAL MEDICINE

## 2024-10-23 PROCEDURE — 3008F BODY MASS INDEX DOCD: CPT | Performed by: INTERNAL MEDICINE

## 2024-10-23 ASSESSMENT — PAIN SCALES - GENERAL: PAINLEVEL_OUTOF10: 0-NO PAIN

## 2024-10-23 NOTE — PROGRESS NOTES
"  54 y.o.        female    referred by Dr. Rudolph    patient    CHIEF COMPLAINT: Osteoporosis treatment     HPI:   First diagnosed in 2013 after fell and shattered her wrist from standing. Family history of osteoporosis. DXA in 2013 revealed low bone mass with a Z-score of -2.6 BMD of 0.845 in LI-L4.  Was treated with Fosamax for 5 years and then a drug holiday.   DEXA 11/2021. Left femur total T score: -1.4, left femur neck -1.4, L1-L4= -2.2.   FRAX: WHO FRACTURE RISK ASSESMENT TOOL (10 year probability of fracture %)    Age: 54 y.o.    Sex: female    Weight:   Wt Readings from Last 1 Encounters:   10/23/24 63.3 kg (139 lb 9.6 oz)       Height:   Ht Readings from Last 1 Encounters:   10/23/24 1.715 m (5' 7.5\")       Femoral neck BMD (g/cm2): 0.837      Click hyperlink to calculate risk:  WHO Frax Tool    National Osteporosis Foundation recommends intervention if patient has >= 20% risk of Major Osteoporotic fracture or >=3% risk of hip fracture.     These recommendations are for post-menopausal women and men >50 only.  They do not pertain to patients under age 50.      with BMD 0.919 in L1-L4      History of breast cancer with chemo and colon cancer both in remission. Surgical menopause in 2010.    Taking Calcium and Vit. Supplements. Vit. D- 46.     From Dr. Rudolph: alendronate started 2013, repeat bone densitometry December 2015 with slight progression. Stopped alendronate 5/18 as has completed 5 years. Bone density 11/21 showed improvement in density. Repeat 11/23. Another toe fracture. Would like her to see Haylee, rheumatology to help her manage her bone health.    She has no imminent dental extraction needed.    Patient Active Problem List   Diagnosis    Neck pain    Other lesions of median nerve, unspecified upper limb    History of colonic polyps    Insomnia    Lichen sclerosus    Myopia    Osteoporosis    Traumatic closed displaced fracture of distal end of radius, right, sequela    Vaginal " atrophy    Vulvar irritation    Gastroesophageal reflux disease    GI cancer    Anxiety    Depression    Asthma         Past Medical History:   Diagnosis Date    Acute cystitis with hematuria 10/08/2019    Hematuria due to acute cystitis    Breast cancer (Multi)     Colon cancer (Multi)     Encounter for gynecological examination (general) (routine) without abnormal findings 05/20/2022    Pap smear, as part of routine gynecological examination    Other conditions influencing health status 09/24/2013    Median Nerve Palsy    Other osteoporosis without current pathological fracture 04/21/2015    Idiopathic osteoporosis    Other specified noninflammatory disorders of vagina 10/24/2019    Vaginal mass    Other specified noninflammatory disorders of vagina 07/26/2016    Vaginal lesion    Other specified noninflammatory disorders of vulva and perineum 05/20/2022    Vulvar irritation    Pain in right forearm 03/07/2019    Pain of right forearm    Pain in unspecified hip 11/05/2013    Joint pain, hip    Pain in unspecified lower leg 09/03/2019    Pain in shin    Pain in unspecified wrist 03/09/2019    Wrist pain    Personal history of colonic polyps     History of colon polyps    Personal history of diseases of the blood and blood-forming organs and certain disorders involving the immune mechanism 10/11/2017    History of anemia    Personal history of other diseases of the female genital tract 04/10/2019    History of vaginal bleeding    Personal history of other diseases of the musculoskeletal system and connective tissue     History of rotator cuff tear    Personal history of other diseases of the nervous system and sense organs 01/05/2021    History of myopia    Personal history of other diseases of the respiratory system 09/24/2013    History of allergic rhinitis    Personal history of other diseases of urinary system 01/29/2021    History of hematuria    Personal history of other malignant neoplasm of large intestine  10/08/2019    History of malignant neoplasm of colon    Personal history of other malignant neoplasm of large intestine 10/08/2019    Personal history of colon cancer    Personal history of other mental and behavioral disorders 11/17/2015    History of anxiety disorder    Personal history of other specified conditions 10/19/2021    History of night sweats    Personal history of other specified conditions 10/27/2019    History of urinary frequency    Personal history of other specified conditions 10/27/2019    History of urinary urgency    Unspecified fracture of lower end of unspecified ulna, initial encounter for closed fracture 08/23/2013    Closed fracture of distal end of ulna    Unspecified symptoms and signs involving the genitourinary system 05/09/2022    Lower urinary tract symptoms (LUTS)            Past Surgical History:   Procedure Laterality Date    HAND SURGERY  09/19/2013    Hand Surgery                                                                                                                                                          HEMICOLECTOMY  09/19/2013    Hemicolectomy    MASTECTOMY  09/19/2013    Breast Surgery Mastectomy    OTHER SURGICAL HISTORY  08/06/2014    Closed Treatment Of Fracture Of The Distal Radius    REFRACTIVE SURGERY  05/16/2017    Corneal LASIK    TOTAL ABDOMINAL HYSTERECTOMY W/ BILATERAL SALPINGOOPHORECTOMY  03/27/2019    Total Abdominal Hysterectomy With Removal Of Both Ovaries       No Known Allergies    Medication Documentation Review Audit       Reviewed by oSfiya Bello MA (Medical Assistant) on 10/15/24 at 0810      Medication Order Taking? Sig Documenting Provider Last Dose Status   ascorbic acid (Vitamin C) 1,000 mg tablet [id number] No Take 1 tablet (1,000 mg) by mouth once daily. Historical Provider, MD Past Month Active   cholecalciferol (Vitamin D-3) 50 mcg (2,000 unit) capsule [id number] No Take 1 capsule (50 mcg) by mouth once daily. Historical Provider, MD  Past Month Active   clobetasol (Temovate) 0.05 % ointment [id number] No Apply topically 2 times a day.   Patient not taking: Reported on 8/5/2024    Mikaela Rudolph MD Not Taking Active   cyanocobalamin, vitamin B-12, 1,000 mcg tablet, sublingual [id number] No Place 1 tablet (1,000 mcg) under the tongue once daily. Historical Provider, MD Past Month Active   estradiol (Estrace) 0.01 % (0.1 mg/gram) vaginal cream [id number] No Insert 0.5 Applicatorfuls (2 g) into the vagina 3 (three) times a week. Historical Provider, MD Past Month Active   fish oil concentrate (Omega-3) 120-180 mg capsule [id number] No Take 1 capsule (1 g) by mouth once daily. Historical Provider, MD Past Month Active   fluticasone (Flonase) 50 mcg/actuation nasal spray [id number] No Administer 1 spray into each nostril once daily. Mikaela Rudolph MD Past Week Active   magnesium gluconate (Magonate) 27.5 mg magne- sium (500 mg) tablet [id number] No Take 1 tablet (27.5 mg) by mouth once daily. Historical Provider, MD Past Month Active   omeprazole (PriLOSEC) 20 mg DR capsule [id number] No Take 1 daily before breakfast for 90 days.  Then stop and see if the reflux is still an issue or not.  Do not crush or chew. Mikaela Rudolph MD 8/4/2024 Active   venlafaxine XR (Effexor-XR) 75 mg 24 hr capsule [id number] No TAKE 1 CAPSULE ONCE DAILY WITH FOOD. Mikaela Rudolph MD 8/4/2024 Active   zolpidem (Ambien) 5 mg tablet [id number] No 1-2 at bedtime 5 nights in a row to help reset your sleep as you start your CBT Mikaela Rudolph MD Past Month Active                      Has family history of OP      Social History     Tobacco Use    Smoking status: Never     Passive exposure: Never    Smokeless tobacco: Never   Vaping Use    Vaping status: Never Used   Substance Use Topics    Alcohol use: Not Currently    Drug use: Never           REVIEW OF SYSTEMS:  Constitutional: No fatigue  Skin:  No rash or psoriasis or photosensitvity  Head: No headache, oral ulcers or hair  "loss  Neck: No difficulty swallowing or choking  Eyes: No dry eyes or iritis  Mouth: Dry mouth but no oral ulcers  Pulmonary: No wheezing, pleurisy or SOB  Cardiovascular: No chest pain or palpitations  Gastrointestinal: No abdominal pain, nausea, heartburn, or blood in stool  Endocrine: No Raynaud's   Musculoskeletal: As H/P      PHYSICAL EXAM:  Blood pressure 100/69, pulse 77, temperature 36.4 °C (97.6 °F), temperature source Temporal, height 1.715 m (5' 7.5\"), weight 63.3 kg (139 lb 9.6 oz), SpO2 97%.  General - NAD, sitting up in chair, well-groomed, pleasant, AAOx3  Head: Normocephalic, atraumatic  Eyes - PERRLA, EOMI. No conjunctiva injection.   Mouth/ENT - Moist oral and nasal mucosa. No facial rash. No enlarged parotid or submandibular gland. Adequate salivary pooling.  Cardiovascular - Normal S1, S2. Regular rate and rhythm. No murmurs or rubs.  Lungs - Symmetric chest expansion. Clear to auscultation bilaterally.   Skin - No rashes or ulcers. Skin warm and dry. No erythema on bilateral cheeks.  Abdomen - Soft, non-tender. No masses. Normal bowel sounds.  Extremities - No edema, cyanosis ,or clubbing  Neurological - Alert and oriented x 3,  grossly intact. No focal deficit.  Musculoskeletal -  EXAMJOINTDETAILED,  BACK: No scoliosis.       MR breast blateral wo IV contrast for implant integrity 08/13/2024 [id number] Final       Assessment/plan: 54 yr old WF with H/O OP. Last DXA in 2021. Will repeat DXA and markers of bone turnover. Treatment pending results.    Reviewed and approved by ARIEL DALEY on 10/23/24 at 9:48 AM.      Ariel Daley MD   "

## 2024-11-27 PROCEDURE — RXMED WILLOW AMBULATORY MEDICATION CHARGE

## 2024-12-06 ENCOUNTER — PHARMACY VISIT (OUTPATIENT)
Dept: PHARMACY | Facility: CLINIC | Age: 54
End: 2024-12-06
Payer: COMMERCIAL

## 2024-12-07 DIAGNOSIS — K21.9 GASTROESOPHAGEAL REFLUX DISEASE, UNSPECIFIED WHETHER ESOPHAGITIS PRESENT: ICD-10-CM

## 2024-12-07 RX ORDER — OMEPRAZOLE 20 MG/1
CAPSULE, DELAYED RELEASE ORAL
Qty: 90 CAPSULE | Refills: 1 | Status: CANCELLED | OUTPATIENT
Start: 2024-12-07

## 2024-12-17 ENCOUNTER — OFFICE VISIT (OUTPATIENT)
Dept: OPHTHALMOLOGY | Facility: CLINIC | Age: 54
End: 2024-12-17
Payer: COMMERCIAL

## 2024-12-17 DIAGNOSIS — H04.123 DRY EYES: ICD-10-CM

## 2024-12-17 DIAGNOSIS — H52.01 HYPEROPIA OF RIGHT EYE: Primary | ICD-10-CM

## 2024-12-17 DIAGNOSIS — Z98.890 HX OF LASER ASSISTED IN SITU KERATOMILEUSIS: ICD-10-CM

## 2024-12-17 DIAGNOSIS — H52.4 PRESBYOPIA OF BOTH EYES: ICD-10-CM

## 2024-12-17 PROBLEM — T85.43XA BREAST IMPLANT RUPTURE: Status: ACTIVE | Noted: 2024-10-15

## 2024-12-17 PROCEDURE — 92014 COMPRE OPH EXAM EST PT 1/>: CPT | Performed by: OPTOMETRIST

## 2024-12-17 PROCEDURE — 92015 DETERMINE REFRACTIVE STATE: CPT | Performed by: OPTOMETRIST

## 2024-12-17 ASSESSMENT — REFRACTION_MANIFEST
OS_SPHERE: -0.25
OS_AXIS: 155
OD_AXIS: 060
OS_SPHERE: -0.50
OD_CYLINDER: -0.25
OD_ADD: +1.75
OS_AXIS: 167
OD_SPHERE: +0.75
OS_CYLINDER: -0.50
OD_CYLINDER: -0.25
OS_CYLINDER: -0.25
METHOD_AUTOREFRACTION: 1
OS_ADD: +1.75
OD_AXIS: 058
OD_SPHERE: +1.00

## 2024-12-17 ASSESSMENT — REFRACTION_CURRENTRX
OD_SPHERE: +1.00
OS_BASECURVE: 9.0
OS_BASECURVE: 8.5
OD_DIAMETER: 14.3
OD_DIAMETER: 14.3
OD_SPHERE: +1.00
OS_BRAND: ACUVUE OASYS MAX 1 DAY MF
OS_BASECURVE: 8.4
OD_BASECURVE: 8.4
OS_SPHERE: +1.00
OS_BRAND: ACUVUE OASYS 1 DAY
OD_ADD: +1.75
OS_BRAND: ACUVUE OASYS 1 DAY
OS_ADD: +1.75
OS_DIAMETER: 14.3
OS_SPHERE: +0.50
OS_SPHERE: -0.50
OD_BASECURVE: 9.0
OD_BRAND: ACUVUE OASYS MAX 1 DAY MF
OS_DIAMETER: 14.3
OD_BRAND: ACUVUE OASYS 1 DAY
OD_BASECURVE: 8.5
OD_DIAMETER: 14.3
OD_SPHERE: +1.00
OS_DIAMETER: 14.3
OD_BRAND: ACUVUE OASYS 1 DAY

## 2024-12-17 ASSESSMENT — CONF VISUAL FIELD
METHOD: COUNTING FINGERS
OD_SUPERIOR_TEMPORAL_RESTRICTION: 0
OS_INFERIOR_TEMPORAL_RESTRICTION: 0
OD_INFERIOR_TEMPORAL_RESTRICTION: 0
OS_NORMAL: 1
OS_SUPERIOR_NASAL_RESTRICTION: 0
OS_INFERIOR_NASAL_RESTRICTION: 0
OD_INFERIOR_NASAL_RESTRICTION: 0
OS_SUPERIOR_TEMPORAL_RESTRICTION: 0
OD_SUPERIOR_NASAL_RESTRICTION: 0
OD_NORMAL: 1

## 2024-12-17 ASSESSMENT — EXTERNAL EXAM - RIGHT EYE: OD_EXAM: NORMAL

## 2024-12-17 ASSESSMENT — VISUAL ACUITY
METHOD: SNELLEN - LINEAR
OS_CC: 20/20
OD_CC: 20/40
OS_CC+: -1

## 2024-12-17 ASSESSMENT — ENCOUNTER SYMPTOMS: EYES NEGATIVE: 1

## 2024-12-17 ASSESSMENT — EXTERNAL EXAM - LEFT EYE: OS_EXAM: NORMAL

## 2024-12-17 ASSESSMENT — CUP TO DISC RATIO
OS_RATIO: .2
OD_RATIO: .2

## 2024-12-17 ASSESSMENT — TONOMETRY
IOP_METHOD: GOLDMANN APPLANATION
OD_IOP_MMHG: 12
OS_IOP_MMHG: 12

## 2024-12-17 NOTE — PROGRESS NOTES
Assessment/Plan   Diagnoses and all orders for this visit:  Hyperopia of right eye  Hx of laser assisted in situ keratomileusis  Dry eyes  Presbyopia of both eyes    A spectacle prescription was dispensed to be used as needed.    Fit Cls to return to mild monovision which Tanya had after LASIK  Does best with +1.00 Right eye and either +1.00 or +0.50 Left eye  I like 8.5 BC best in 1 day Oasys  Provided trials and patient will call back with progress check    For Dry Eye:  Warm compresses/Lid Scrubs  Lipid based Art Tears  Sample Xiidra BID Both eyes    May consider Meibo if above do not work

## 2024-12-18 ENCOUNTER — HOSPITAL ENCOUNTER (OUTPATIENT)
Dept: RADIOLOGY | Facility: HOSPITAL | Age: 54
Discharge: HOME | End: 2024-12-18
Payer: COMMERCIAL

## 2024-12-18 DIAGNOSIS — M81.0 OSTEOPOROSIS WITHOUT CURRENT PATHOLOGICAL FRACTURE, UNSPECIFIED OSTEOPOROSIS TYPE: ICD-10-CM

## 2024-12-18 PROCEDURE — 77080 DXA BONE DENSITY AXIAL: CPT

## 2025-01-10 NOTE — PROGRESS NOTES
"History of Present Illness:  Radha Tellez \"Adriana" is a 54 y.o. female with a personal history of breast and colon cancer.  Radha Tellez \"Adriana" was referred to the Cancer Genetics Clinic at [facility] by Aurora Rios APRN-C*.     Cancer Medical History:  Personal history of cancer? Yes  Type: Stage IA right breast infiltrating ductal carcinoma; colon adenocarcinoma  Age at diagnosis: 34  Summary: Per  Aurora Rios  Stage IA right breast infiltrating ductal carcinoma, diagnosed in March 2005 during pregnancy. She was treated with partial mastectomy, showing  grade 3, 1 cm invasive cancer with 0 of 4 sentinel lymph nodes. Tumor was ER/MO negative and HER-2 amplified, margins were not cleared, and therefore, she elected for right mastectomy showing a residual 3.7 cm of high-grade ductal carcinoma in situ without  residual invasive component, one intramammary lymph node was negative, T1b, N0, M0.   2. Four cycles of Adriamycin and Cytoxan, completed July 2005. She delivered her child on August 31, 2005, and breast-fed for 6 weeks before initiating 1 year of Herceptin, completed October 2005 through October 2006.   3. Genetic testing for BRCA1 and BRCA2 was negative in 2010.  4. Tamoxifen initiated in October 2006, course was not completed.   5. Left prophylactic mastectomy in January 2007 with benign histology. Bilateral silicone implants were placed.   6. Iron-deficiency anemia noted summer 2007. Colonoscopy showed tubular adenocarcinoma and she underwent right hemicolectomy on September 28, 2010. She had total abdominal hysterectomy and bilateral salpingo-oophorectomy. Hospital course was complicated  by postoperative hemorrhage.   7. Tamoxifen discontinued after oophorectomy with close monitoring and followup only.  8. Additional genetic testing for BRCA (geBART) mutation and other hereditary markers was negative.     History of other cancers: Yes - She was diagnosed with adenocarcinoma of " the colon and underwent a yamile-colectomy, SHARON/BSO in  (patient was 39). Most recent colonoscopy  - Healthy signs of previous surgery (right yamile) in the hepatic flexure; Normal.    Prior hereditary cancer (germline) genetic testing? Yes - see attached document  BRCA1/2 sequencing and 5-site rearrangement panel - Negative, done in   Additional BRCA (DANIELLE) testing, ordered in  but cancelled by patient  IHC for MLH1, MSH2, MSH6, PMS2 - Negative, done in . Unclear if DNA testing of Urban syndrome genes was done.  Sequence analysis of MYH showed VUS (IVS5-3T>C) in , later reclassified to favorable polymorphism in 2018    Prior hereditary cancer (germline) genetic testing in family members? No    Cancer screening history:  Mammograms? No, s/p bilateral mastectomy - Upcoming surgery in April for new implants.  PAP smear? Yes, most recent  (before TH-BSO    Colonoscopy? Yes, most recent , normal . Patient reports total of one polyp (which had led to cancer)  Pathology report from 8/24/10 -   ASCENDING COLON, BIOPSY:   -- MODERATELY DIFFERENTIATED ADENOCARCINOMA OF COLON, ARISING IN ASSOCIATION   WITH TUBULAR ADENOMA.   Upper endoscopy? Yes, most recent , normal     Dermatology? Every 3 years.  Other cancer screening? No    Reproductive History:  Number of children: 2  Number of pregnancies: Yes - 2  Age first birth: 31  Breast feeding? Yes    Menarche (age): 15  Menopause (age): TH/BSO  OCP: Yes, for ~10 years total  HRT: No    Hysterectomy? Yes -   Oophorectomy? Yes -       Family history:  A 4-generation pedigree was obtained and was significant for the following:     - Mother diagnosed with DCIS at 60, treated with surgery.  at 68 d/t viral hypertrophic cardiomyopathy.  - Father (82) with prostate cancer at 58, treated with radical prostatectomy, no chemotherapy. Some history of non-melanoma skin cancer,    Maternal ancestry is Russian.  Paternal ancestry is English, Russian.  There is no known Ashkenazi Sikh ancestry. Consanguinity was denied.       Genetic counseling:    Summary  Ms. Dany Tellez is a 54 y.o. female with a personal history of breast and colon cancer.    Radha Tellez's reported history is concerning for possible hereditary cancer. Ms. Dany Tellez meets current national (NCCN) criteria for testing of high-penetrance breast cancer susceptibility genes, including BRCA1, BRCA2, CDH1, PALB2, PTEN, STK11, and TP53 and Urban syndrome susceptibility genes, including MLH1, MSH2, MSH6, PMS2, and EPCAM.    Testing is medically necessary, as it could help clarify cancer risks in the family, and may impact recommendations for cancer surveillance and/or the need for risk-reducing options.    Radha is interested in testing, which is recommended. She had initially decided on pursuing the [employer] CancerNext panel, however in a follow up call Ms. Tellez requested that the order be cancelled, as she wanted more time to decide before proceeding with testing. Ms. Tellez will call me back if she decides to test. Our discussion is summarized below.    Genetic Counseling Discussion    We reviewed genes and chromosomes, inherited forms of  cancer.  We discussed that most cancers are not due to an inherited genetic susceptibility.  However, in about 5-10% of families, there is an inherited genetic mutation that can make a person more susceptible to developing certain forms of cancer.  Within families with a genetic predisposition to cancer, we often see certain patterns, such as multiple family members with cancer and cancers occurring in multiple generations. In addition, earlier onset and/or bilateral cancers are suggestive of an inherited predisposition. There also tends to be a clustering of certain types of related cancer in these families, such as breast and ovarian cancers, or colon and uterine cancers.     We discussed the BRCA1 and BRCA2 genes, which are two genes that have been  linked to early-onset breast and/or ovarian cancer, causing HBOC (hereditary breast and ovarian cancer syndrome).  Mutations in these genes are inherited in a dominant pattern and confer up to an 72% lifetime risk for breast cancer.  This is elevated compared to the general population risk of 10-12%.  In addition, BRCA1 and BRCA2 mutation carriers have up to a 58% lifetime risk for ovarian cancer, which is elevated over the 2% general population risk.     As Ms. Tellez has already had negative testing for BRCA1/2, we discussed that  there are multiple genes associated with increased breast cancer risk.  Some genes, like the BRCA1 and BRCA2 genes, are considered highly penetrant breast cancer genes, meaning a mutation in the gene confers a high risk of breast cancer.  Additionally, there are other intermediate (moderate risk) breast cancer genes.     As an example, we discussed the ALBERTINA gene which has been associated with increased risks for breast, ovarian, pancreatic (PMID: 63457425, 07296217, 84589422) and prostate cancer (PMID: 16667538, [id number]).  Specifically, ALBERTINA is associated with an increased lifetime risk to develop breast cancer, estimated as a 21-24% risk (which is elevated over the average population lifetime risk of 13%). (PMID: 60204886, 11837966, 29510715). The lifetime risks for ovarian cancer is considered 2-3% (general population risk is ~1%), and the pancreatic cancer risk is ~5-10% (versus general population risk of 1-2%) according to the 2023 NCCN guidelines.  The prostate cancer risk is still under investigation, however there is emerging evidence for an increased risk. We briefly reviewed medical management implications for those with a germline ALBERTINA mutation.    For some of the moderate risk genes, there is often limited information regarding the degree to which a mutation in the gene impacts ones risk for different types of cancers.  Additionally, for some of these moderate risk genes, the  appropriate management for individuals who have a mutation in one of these genes is not always clear.  Our knowledge about the cancer risks associated with mutations in these moderate risk genes is always growing, and we will likely be able to provide more comprehensive information in the future. In many cases, even if an individual tests positive for a mutation in a moderate risk gene, recommendations are still based on the family history, not the positive test result.    Based on her early onset colon cancer, we discussed Urban syndrome, an autosomal dominant condition caused by germline mutations in one of five genes - MLH1, MSH2, MSH6, PMS2, and EPCAM. Mutation carriers have an increased risk to develop colorectal cancer over their lifetime, as well as an increased risk for a second primary colon cancer. In addition to the risk for colon cancer, women with Urban syndrome also have an increased risk for endometrial (uterine) cancer and ovarian cancer. Other cancers associated with Urban syndrome include gastric cancer, hepatobiliary, small bowel, urinary tract, and rarely pancreatic cancer. For individuals with Urban syndrome, there are medical management options, such as more frequent colonoscopies, that can be considered to help manage the risk for cancer.     A screening test for Urban syndrome was performed on Ms. Dany Tellez's tumor specimen, which came back normal. This screening test looks to see if the Urban syndrome proteins were expressed in the cancer or not. All four proteins were expressed, which is a normal screening result. While a normal result reduces the chance that Ms. Dany Tellez has Urban syndrome, it does not rule out the possibility. The findings do not exclude underlying Urban Syndrome because some mutations may result in intact protein expression.  In addition, rare alterations can exist in other mismatch proteins, which have not been tested. It is unclear from past records if Ms. Tellez  had DNA based testing of the Urban syndrome genes, however we discussed these would be included in panel testing.    Radha Tellez was counseled about hereditary cancer susceptibility including cancer risks, options for increased screening and/or risk reduction, genetic testing, and the implications for other family members.  We discussed different panel options available to Ms. Dany Tellez.     We reviewed the types of results we can get back:   - A positive result means that we identified a change in a gene that confers an increased cancer risk for cancer. We will discuss potential changes in management for her and her family based on the specific gene mutation found.    - A negative (normal) result clears her for many cancer predisposition syndromes, but cannot clear her for any/all cancer predisposition risks. She would continue to be screened based on her personal and family history.    - A Variant of Uncertain Significance (VUS) means that some kind of change was found in a gene, but it is currently unknown whether this specific change is harmful.  These results do not  recommendations or warrant familial variant testing.    We discussed the Genetic Information Nondiscrimination Act (BRONWYN). We discussed the protections and limitations of BRONWYN. BRONWYN generally makes in illegal for health insurance companies, group health plans, and most employers (with >15 employees) to discriminate based on genetic information. It does not protect against genetic discrimination for life insurance, disability insurance, long-term care, or other insurances.    PLAN:  Radha is interested in testing, which is recommended. She had initially decided on pursuing the [employer] CancerNext panel, however in a follow up call Ms. Tellez requested that the order be cancelled, as she wanted more time to decide before proceeding with testing. Ms. Tellez will call me back if she decides to test.   We remain available to Ms.  Dany Tellez at [phone] if any questions arise regarding information discussed at today's visit.    Chayito El MS, GC  Licensed Genetic Counselor  [facility]  Phone: [phone]     Total time spent on day of encounter: 66 minutes (31 minutes with patient, 35 minutes on pre/post patient care activities, including documentation).    An virtual (video and audio) between the patient (at the originating site) and provider (at the distant site) was utilized to provide this telehealth service. Verbal consent was requested and obtained from Radha Tellez on this date, January 13, 2025 for a telehealth visit.  The patient stated they were located in [place] at the time of visit.

## 2025-01-13 ENCOUNTER — APPOINTMENT (OUTPATIENT)
Facility: CLINIC | Age: 55
End: 2025-01-13
Payer: COMMERCIAL

## 2025-01-13 DIAGNOSIS — Z80.3 FAMILY HISTORY OF BREAST CANCER: ICD-10-CM

## 2025-01-13 DIAGNOSIS — Z80.42 FAMILY HISTORY OF PROSTATE CANCER: ICD-10-CM

## 2025-01-13 DIAGNOSIS — Z85.038 HISTORY OF COLON CANCER: ICD-10-CM

## 2025-01-13 DIAGNOSIS — Z85.3 HISTORY OF LEFT BREAST CANCER: ICD-10-CM

## 2025-01-13 DIAGNOSIS — Z92.21 HISTORY OF ANTINEOPLASTIC CHEMOTHERAPY: ICD-10-CM

## 2025-01-13 DIAGNOSIS — Z71.83 ENCOUNTER FOR NONPROCREATIVE GENETIC COUNSELING: Primary | ICD-10-CM

## 2025-01-14 ENCOUNTER — APPOINTMENT (OUTPATIENT)
Dept: PLASTIC SURGERY | Facility: CLINIC | Age: 55
End: 2025-01-14
Payer: COMMERCIAL

## 2025-02-11 ENCOUNTER — APPOINTMENT (OUTPATIENT)
Dept: PRIMARY CARE | Facility: CLINIC | Age: 55
End: 2025-02-11
Payer: COMMERCIAL

## 2025-02-11 VITALS
BODY MASS INDEX: 21.07 KG/M2 | WEIGHT: 139 LBS | DIASTOLIC BLOOD PRESSURE: 62 MMHG | SYSTOLIC BLOOD PRESSURE: 103 MMHG | HEIGHT: 68 IN | HEART RATE: 70 BPM

## 2025-02-11 DIAGNOSIS — J30.9 ALLERGIC RHINITIS, UNSPECIFIED SEASONALITY, UNSPECIFIED TRIGGER: ICD-10-CM

## 2025-02-11 DIAGNOSIS — N95.2 VAGINAL ATROPHY: Primary | ICD-10-CM

## 2025-02-11 DIAGNOSIS — K21.9 GASTROESOPHAGEAL REFLUX DISEASE, UNSPECIFIED WHETHER ESOPHAGITIS PRESENT: ICD-10-CM

## 2025-02-11 DIAGNOSIS — N90.4 LICHEN SCLEROSUS OF VULVA: ICD-10-CM

## 2025-02-11 DIAGNOSIS — F32.A DEPRESSION, UNSPECIFIED DEPRESSION TYPE: ICD-10-CM

## 2025-02-11 DIAGNOSIS — F41.9 ANXIETY: ICD-10-CM

## 2025-02-11 PROCEDURE — RXMED WILLOW AMBULATORY MEDICATION CHARGE

## 2025-02-11 RX ORDER — FLUTICASONE PROPIONATE 50 MCG
1 SPRAY, SUSPENSION (ML) NASAL DAILY
Qty: 32 G | Refills: 3 | Status: SHIPPED | OUTPATIENT
Start: 2025-02-11

## 2025-02-11 RX ORDER — CLOBETASOL PROPIONATE 0.5 MG/G
OINTMENT TOPICAL 2 TIMES DAILY
Qty: 60 G | Refills: 11 | Status: SHIPPED | OUTPATIENT
Start: 2025-02-11 | End: 2026-02-11

## 2025-02-11 RX ORDER — VENLAFAXINE HYDROCHLORIDE 75 MG/1
75 CAPSULE, EXTENDED RELEASE ORAL DAILY
Qty: 90 CAPSULE | Refills: 3 | Status: SHIPPED | OUTPATIENT
Start: 2025-02-11 | End: 2026-02-11

## 2025-02-11 RX ORDER — AZELASTINE 1 MG/ML
1 SPRAY, METERED NASAL 2 TIMES DAILY
Qty: 30 ML | Refills: 11 | Status: SHIPPED | OUTPATIENT
Start: 2025-02-11 | End: 2026-02-11

## 2025-02-11 RX ORDER — ESTRADIOL 0.1 MG/G
2 CREAM VAGINAL 3 TIMES WEEKLY
Qty: 42.5 G | Refills: 11 | Status: SHIPPED | OUTPATIENT
Start: 2025-02-12

## 2025-02-11 RX ORDER — OMEPRAZOLE 20 MG/1
CAPSULE, DELAYED RELEASE ORAL
Qty: 90 CAPSULE | Refills: 3 | Status: SHIPPED | OUTPATIENT
Start: 2025-02-11

## 2025-02-11 NOTE — PROGRESS NOTES
"Subjective   Patient ID:   [id number]   [id number]   Radha Tellez \"Tanya\" is a 54 y.o. female who presents for No chief complaint on file..  HPI    54 year old female here for annual exam.  She notes her oldest daughter is in [place] for the semester.  Her younger daughter is at  and skating in [place] and just competed in [place].  They went to [place] for 2 weeks.  She saw Haylee with bone density.  She notes omeprazole was life changing.  She has been off for one month and notes more symptoms.  She went back to genetics and will proceed eventually with the plan.  Has discomfort neck.      ROS were reviewed and are negative with the exception of what is noted in HPI    There were no vitals taken for this visit.  Objective   Physical Exam  Vitals reviewed.   HENT:      Head: Normocephalic and atraumatic.      Right Ear: Tympanic membrane normal.      Left Ear: Tympanic membrane normal.      Nose:      Comments: Nasal turbinate clear bilaterally     Mouth/Throat:      Mouth: Mucous membranes are dry.      Comments: Cobblestoning posterior pharynx  Eyes:      Pupils: Pupils are equal, round, and reactive to light.   Neck:      Comments: Shoddy cervical anterior and posterior nodes,  no supraclavicular nodes,  trachea midline, tender submandibular nodes  Musculoskeletal:      Cervical back: Neck supple.   Lymphadenopathy:      Cervical: Cervical adenopathy present.   Neurological:      Mental Status: She is alert.       Assessment/Plan      # neck pain-resolved with proper exercises provided by PT friend.  Thinks it was muscular       #. Breast cancer - originally diagnosis 2005 ER negative , NE 5% +,  HER2 +, s/p adriamycin and cytoxan chemotherapy. s/p mastectomy bilaterally. BRCA negative . Follows with survivor clinic, Keanu Rios.   Plans for implant removal and replacement as has intracapsular ruptures (not extracapsular).  She will do in the spring as needs no lifting for 2 weeks post op.    #. Colon " cancer - tubular adenocarcinoma found on polyp with colonoscopy 2010, negative colonoscopy 8/2014 and recommended repeat scope in 2019 next repeat 2024 ordered today.  Endoscopist is JAYJAY.    #. Depression, anxiety, insomnia - stable on Effexor 75 mg  daily.   #. Osteopenia, osteoporosis - alendronate started 2013, repeat bone densitometry 06/24 showed stability and improvement as compared  bone density 11//21 which also showed improvement in density.  Stopped alendronate 5/18 after completed 5 years. Encourage healthy lifestyle. Did have toe fracture. 2024  Saw Haylee rheumatology to help her manage her bone health and dexa was better so deferred treatment at this time.    #. Palpitation episode - infrequent and feels like she can valsalva and it goes away.   # Allergic Rhinitis -still a major issue for her.  Trial azelastine and referreal to ENT sinus personnel for their input.  Uses nasal steroids and antihistamines used regularly. Her dog presence may make this worse.    #. Vaginal bleeding, urine symptoms, urethral caruncle, vuvlovaginal atrophy and lichen sclerosis identified by GYN -  appreciate GYN input, s/p hysterectomy, her symptoms cleared up completely with clobetazole.  We reviewed uptodate together and appropriate  to keep her on maintenance therapy.  Atrophic symptoms okay with KY jelly. Vagifem did help but generic version with unpleasant odor so stopped and has not been as bad.   #. Nephrolithiasis - big water drinker  #. Moles - derm told her moles do not look worrisome so annual visit unnecessary        #. Malodorous stools - still an issue but takes omega fish oil  #. Burp, GERD - still with burping, lats EGD 8/24 was unremarkable by Jayjay.     #. Hair thinning - no clear source and she suspects related to aging and hormone changes.   #. Wrist fracture - overall better  #. Health maintenance   - she has had SHARON and BSO, needs annual pelvic due to lichen sclerosis.  Has GYN now.    - current  immunizations, prevnar on Friday  - colonoscopy due 2029   - goes to cancer survivor clinic  - physical exam 6/24  Mikaela Rudolph MD

## 2025-02-11 NOTE — PATIENT INSTRUCTIONS
Good to see you today  2.  Future PCP recommendations. From our residency: Vincent Guevara, Enedelia Gonzales (should end up at [facility] in primary care end of year), Preston Adam.  Landry Drake and Eliu Doran in [place].  Stephen Haines at [facility] has a good practice but they are not accepting new patients currently.  Family med females with good reputations are Griselda Crawford and Mara Borden. Alicia Dsouza internist at [place].  3. Referral to ENT, Enoc Singh for his input regarding your chronic sinus issues and submandibular discomfort.  Try the azelastine first before the flonase.  May need both but try a period off the steroids.    4. Your bone density has not changed for 9 years!!!  5.  No change to your medications otherwise.    6.  Colonoscopy 2029  7.  Thanks for getting the genetic consult.  Would recommend the 38 panel.    8.  Good luck with the new breast implants  9.  So glad that your daughters are in a good place  10.  It has been a privilege and pleasure to be your doctor, colleague and friend.   Wishing you good health.

## 2025-02-15 ENCOUNTER — PHARMACY VISIT (OUTPATIENT)
Dept: PHARMACY | Facility: CLINIC | Age: 55
End: 2025-02-15
Payer: COMMERCIAL

## 2025-03-10 NOTE — PROGRESS NOTES
PLASTIC SURGERY PRE-OP CLINIC NOTE  Date: 3/11/25  Subjective :  Patient ID: Radha Tellez  is a 54 y.o. female is here for pre-op appointment     Planned Date of Procedure: 4/3/25  Planned Surgical Procedure: Bilateral Implant Exchange     HPI:   Radha Tellez is a 54 y.o. female is here for pre-operative appointment for the above procedure(s).     Currently, the patient states there were no changes in their medications or medical history.     Patient's vitals are Visit Vitals  BP 92/57   Pulse 76    today.     Patient is not currently on an ACE, ARB, or Diuretic.     Patient has Good Rx Card.     Patient is not on chronic NSAIDs.       MEDICATIONS    Current Outpatient Medications:     acetaminophen (Tylenol Extra Strength) 500 mg tablet, Take 2 tablets (1,000 mg) by mouth every 8 hours for 14 days., Disp: 84 tablet, Rfl: 0    ascorbic acid (Vitamin C) 1,000 mg tablet, Take 1 tablet (1,000 mg) by mouth once daily., Disp: , Rfl:     azelastine (Astelin) 137 mcg (0.1 %) nasal spray, Administer 1 spray into each nostril 2 times a day. Use in each nostril as directed, Disp: 30 mL, Rfl: 11    celecoxib (CeleBREX) 200 mg capsule, Take 1 capsule (200 mg) by mouth 2 times a day for 14 days., Disp: 28 capsule, Rfl: 0    chlorhexidine (Hibiclens) 4 % external liquid, Apply topically 1 time for 1 dose. Shower normally. Apply Hibiclens to surgical area from clavicle to hips making sure to apply under the breast and axilla as well as in the belly button.  Do not worry about the back. Do not wash off., Disp: 118 mL, Rfl: 0    cholecalciferol (Vitamin D-3) 50 mcg (2,000 unit) capsule, Take 1 capsule (50 mcg) by mouth once daily., Disp: , Rfl:     clobetasol (Temovate) 0.05 % ointment, Apply topically 2 times a day., Disp: 60 g, Rfl: 11    cyanocobalamin, vitamin B-12, 1,000 mcg tablet, sublingual, Place 1 tablet (1,000 mcg) under the tongue once daily., Disp: , Rfl:     estradiol (Estrace) 0.01 % (0.1 mg/gram) vaginal  cream, Insert 0.5 Applicatorfuls (2 g) into the vagina 3 (three) times a week., Disp: 42.5 g, Rfl: 11    fish oil concentrate (Omega-3) 120-180 mg capsule, Take 1 capsule (1 g) by mouth once daily., Disp: , Rfl:     fluticasone (Flonase) 50 mcg/actuation nasal spray, Administer 1 spray into each nostril once daily., Disp: 32 g, Rfl: 3    gabapentin (Neurontin) 300 mg capsule, Take 1 capsule (300 mg) by mouth every 8 hours for 14 days., Disp: 42 capsule, Rfl: 0    magnesium gluconate (Magonate) 27.5 mg magne- sium (500 mg) tablet, Take 1 tablet (27.5 mg) by mouth once daily., Disp: , Rfl:     omeprazole (PriLOSEC) 20 mg DR capsule, Take 1 daily before breakfast for 90 days.  Then stop and see if the reflux is still an issue or not.  Do not crush or chew., Disp: 90 capsule, Rfl: 3    sulfamethoxazole-trimethoprim (Bactrim DS) 800-160 mg tablet, Take 1 tablet by mouth 2 times a day for 14 days., Disp: 28 tablet, Rfl: 0    venlafaxine XR (Effexor-XR) 75 mg 24 hr capsule, TAKE 1 CAPSULE ONCE DAILY WITH FOOD., Disp: 90 capsule, Rfl: 3     REVIEW OF SYSTEMS:    Constitutional: negative for fevers, chills, unintentional weight loss  HEENT: negative for changes in vision, headaches, changes in hearing, congestion, sore throat  Cardiovascular: negative for chest pain, palpitations  Respiratory: negative for cough, shortness of breath  Gastrointestinal: negative for nausea, vomiting, diarrhea  Genitourinary: negative for dysuria, hematuria  Musculoskeletal: negative for joint swelling or pain  Skin: negative for rashes or lesions  Psych: negative for depression, anxiety  Endocrine: negative for polyuria, polydipsia, cold/heat intolerance  Hem/Lymph: negative for bleeding disorder    PHYSICAL EXAM  General: alert and oriented, no apparent distress  Psych: normal mood and affect, judgement intact.   Eyes: No conjunctival erythema, extraocular movements intact, no scleral icterus, pupils equal and reactive to light  HENT:  normocephalic, atraumatic, no rhinorrhea, no trauma to external meatus, no prior surgical scars  CV: hemodynamically stable  Resp: unlabored, no increased work of breathing, equal bilateral chest rise, no cough or stridor  Abdomen: soft, non-tender, non-distended. No surgical scars present. No rashes noted. No rectus diastasis present   Neuro: Unremarkable without focal findings, AAOx3, CN 2-12 grossly intact  Extremities/Musculoskeletal: Upper and lower extremities are atraumatic in appearance without tenderness or deformity. No swelling or erythema. Full range of motion is noted to all joints. Steady gait noted.    Skin: Intact, soft and warm; no rashes, lesions, or bruising. No large masses or keloids noted on exam.     Focused exam of the breasts:  Bra size: B     Left Breast:  NTN: 20  BW: 13  NTF: 5.5  NAC: 3  Comments: Left Breast visible IMF incision with slight bottoming out of implant     Right Breast:  NTN: 20  BW: 13  NTF: 5.5  NAC:  Comments: Right Horizontal Mastectomy Scar by Nipple, Upper pole indentation from post op fat necrosis    LABORATORY  Nutrition  Lab Results   Component Value Date    ALBUMIN 4.4 06/05/2024          ASSESSMENT/PLAN  Radha Tellez is a 54 y.o. 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 She is s/p bilateral mastectomy with bilateral silicone implants in 2007.  She has bilateral implant rupture per August MRI     The patient will be undergoing Bilateral Implant Exchange on 4/3/25 at [place].     Implant:  -Base width 13-14cm   -High profile 475, 500 and 550   -Moderate plus 400, 450 and 475    -Will go through her pre existing incision on the right; jeny-areolar with a lateral excision, and through her IMF on the left.      Informed consent discussed with the patient, including: condition, proposed care, treatments and services, alternative forms of treatment, and risks of no treatment.  Details discussed around the procedures to  be used, and the risks and hazards involved, potential benefits, and side effects of the patient's proposed care, treatment, and services; the likelihood of the patient achieving his or her goals; and any potential problems that might occur during recuperation. Reasonable alternative also discussed with the patient's proposed care, treatment, and services. The discussion encompasses risks, benefits, and side effects related to the alternative and risks related to not receiving the proposed care, treatment, and services. Written informed consent was obtained.    The patient was counseled to avoid anticoagulation medications and herbals including - but not limited to - ASA, NSAIDs, Plavix, fish oils, and green tea    Patient was counseled to avoid nicotine and areas with high amounts of secondhand smoke.     Patient was counseled to increase protein intake after surgery to aid with wound healing with goal of 120g/day.     Patient was counseled on need for compression garments and/or support bras, given information about where to acquire these garments, and instructions to bring with on the day of surgery.     We discussed postoperative follow-up visits, recuperation and return to work.    Advised patient to contact office with any questions or concerns    All findings and diagnosis was discussed with patient at the time of this visit. Patient states they understand and are in agreement with the treatment plan at the time of this visit.       Medications eRx'd:  -Celebrex 200 mg BID with meals- Good Rx card provided to the patient.  -Gabapentin 300 mg Q8H   -Tylenol 1000 mg Q8H  -BactrimDS BID x 7 days   -Hibiclens - instructed the patient to wash breast and/or abdomen and margins the night before surgery or the morning of surgery; avoid washing face/eyes     RTC 4/15/25 after surgery     Scribe Attestation  By signing my name below, I, Edad Adilson, Olegarioibe, attest that this documentation has been prepared under the  direction and in the presence of Bella Chavez MD. Verbal consent obtained from the patient.      Attending Attestation:  IBella MD, personal performed the history, exam, and decision making on this patient.  A total of 30 mins were spent in patient counseling, review of medical records, and coordination of care.

## 2025-03-10 NOTE — H&P (VIEW-ONLY)
PLASTIC SURGERY PRE-OP CLINIC NOTE  Date: 3/11/25  Subjective :  Patient ID: Radha Tellez  is a 54 y.o. female is here for pre-op appointment     Planned Date of Procedure: 4/3/25  Planned Surgical Procedure: Bilateral Implant Exchange     HPI:   Radha Tellez is a 54 y.o. female is here for pre-operative appointment for the above procedure(s).     Currently, the patient states there were no changes in their medications or medical history.     Patient's vitals are Visit Vitals  BP 92/57   Pulse 76    today.     Patient is not currently on an ACE, ARB, or Diuretic.     Patient has Good Rx Card.     Patient is not on chronic NSAIDs.       MEDICATIONS    Current Outpatient Medications:     acetaminophen (Tylenol Extra Strength) 500 mg tablet, Take 2 tablets (1,000 mg) by mouth every 8 hours for 14 days., Disp: 84 tablet, Rfl: 0    ascorbic acid (Vitamin C) 1,000 mg tablet, Take 1 tablet (1,000 mg) by mouth once daily., Disp: , Rfl:     azelastine (Astelin) 137 mcg (0.1 %) nasal spray, Administer 1 spray into each nostril 2 times a day. Use in each nostril as directed, Disp: 30 mL, Rfl: 11    celecoxib (CeleBREX) 200 mg capsule, Take 1 capsule (200 mg) by mouth 2 times a day for 14 days., Disp: 28 capsule, Rfl: 0    chlorhexidine (Hibiclens) 4 % external liquid, Apply topically 1 time for 1 dose. Shower normally. Apply Hibiclens to surgical area from clavicle to hips making sure to apply under the breast and axilla as well as in the belly button.  Do not worry about the back. Do not wash off., Disp: 118 mL, Rfl: 0    cholecalciferol (Vitamin D-3) 50 mcg (2,000 unit) capsule, Take 1 capsule (50 mcg) by mouth once daily., Disp: , Rfl:     clobetasol (Temovate) 0.05 % ointment, Apply topically 2 times a day., Disp: 60 g, Rfl: 11    cyanocobalamin, vitamin B-12, 1,000 mcg tablet, sublingual, Place 1 tablet (1,000 mcg) under the tongue once daily., Disp: , Rfl:     estradiol (Estrace) 0.01 % (0.1 mg/gram) vaginal  cream, Insert 0.5 Applicatorfuls (2 g) into the vagina 3 (three) times a week., Disp: 42.5 g, Rfl: 11    fish oil concentrate (Omega-3) 120-180 mg capsule, Take 1 capsule (1 g) by mouth once daily., Disp: , Rfl:     fluticasone (Flonase) 50 mcg/actuation nasal spray, Administer 1 spray into each nostril once daily., Disp: 32 g, Rfl: 3    gabapentin (Neurontin) 300 mg capsule, Take 1 capsule (300 mg) by mouth every 8 hours for 14 days., Disp: 42 capsule, Rfl: 0    magnesium gluconate (Magonate) 27.5 mg magne- sium (500 mg) tablet, Take 1 tablet (27.5 mg) by mouth once daily., Disp: , Rfl:     omeprazole (PriLOSEC) 20 mg DR capsule, Take 1 daily before breakfast for 90 days.  Then stop and see if the reflux is still an issue or not.  Do not crush or chew., Disp: 90 capsule, Rfl: 3    sulfamethoxazole-trimethoprim (Bactrim DS) 800-160 mg tablet, Take 1 tablet by mouth 2 times a day for 14 days., Disp: 28 tablet, Rfl: 0    venlafaxine XR (Effexor-XR) 75 mg 24 hr capsule, TAKE 1 CAPSULE ONCE DAILY WITH FOOD., Disp: 90 capsule, Rfl: 3     REVIEW OF SYSTEMS:    Constitutional: negative for fevers, chills, unintentional weight loss  HEENT: negative for changes in vision, headaches, changes in hearing, congestion, sore throat  Cardiovascular: negative for chest pain, palpitations  Respiratory: negative for cough, shortness of breath  Gastrointestinal: negative for nausea, vomiting, diarrhea  Genitourinary: negative for dysuria, hematuria  Musculoskeletal: negative for joint swelling or pain  Skin: negative for rashes or lesions  Psych: negative for depression, anxiety  Endocrine: negative for polyuria, polydipsia, cold/heat intolerance  Hem/Lymph: negative for bleeding disorder    PHYSICAL EXAM  General: alert and oriented, no apparent distress  Psych: normal mood and affect, judgement intact.   Eyes: No conjunctival erythema, extraocular movements intact, no scleral icterus, pupils equal and reactive to light  HENT:  normocephalic, atraumatic, no rhinorrhea, no trauma to external meatus, no prior surgical scars  CV: hemodynamically stable  Resp: unlabored, no increased work of breathing, equal bilateral chest rise, no cough or stridor  Abdomen: soft, non-tender, non-distended. No surgical scars present. No rashes noted. No rectus diastasis present   Neuro: Unremarkable without focal findings, AAOx3, CN 2-12 grossly intact  Extremities/Musculoskeletal: Upper and lower extremities are atraumatic in appearance without tenderness or deformity. No swelling or erythema. Full range of motion is noted to all joints. Steady gait noted.    Skin: Intact, soft and warm; no rashes, lesions, or bruising. No large masses or keloids noted on exam.     Focused exam of the breasts:  Bra size: B     Left Breast:  NTN: 20  BW: 13  NTF: 5.5  NAC: 3  Comments: Left Breast visible IMF incision with slight bottoming out of implant     Right Breast:  NTN: 20  BW: 13  NTF: 5.5  NAC:  Comments: Right Horizontal Mastectomy Scar by Nipple, Upper pole indentation from post op fat necrosis    LABORATORY  Nutrition  Lab Results   Component Value Date    ALBUMIN 4.4 06/05/2024          ASSESSMENT/PLAN  Radha Tellez is a 54 y.o. 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 She is s/p bilateral mastectomy with bilateral silicone implants in 2007.  She has bilateral implant rupture per August MRI     The patient will be undergoing Bilateral Implant Exchange on 4/3/25 at [place].     Implant:  -Base width 13-14cm   -High profile 475, 500 and 550   -Moderate plus 400, 450 and 475    -Will go through her pre existing incision on the right; jeny-areolar with a lateral excision, and through her IMF on the left.      Informed consent discussed with the patient, including: condition, proposed care, treatments and services, alternative forms of treatment, and risks of no treatment.  Details discussed around the procedures to  be used, and the risks and hazards involved, potential benefits, and side effects of the patient's proposed care, treatment, and services; the likelihood of the patient achieving his or her goals; and any potential problems that might occur during recuperation. Reasonable alternative also discussed with the patient's proposed care, treatment, and services. The discussion encompasses risks, benefits, and side effects related to the alternative and risks related to not receiving the proposed care, treatment, and services. Written informed consent was obtained.    The patient was counseled to avoid anticoagulation medications and herbals including - but not limited to - ASA, NSAIDs, Plavix, fish oils, and green tea    Patient was counseled to avoid nicotine and areas with high amounts of secondhand smoke.     Patient was counseled to increase protein intake after surgery to aid with wound healing with goal of 120g/day.     Patient was counseled on need for compression garments and/or support bras, given information about where to acquire these garments, and instructions to bring with on the day of surgery.     We discussed postoperative follow-up visits, recuperation and return to work.    Advised patient to contact office with any questions or concerns    All findings and diagnosis was discussed with patient at the time of this visit. Patient states they understand and are in agreement with the treatment plan at the time of this visit.       Medications eRx'd:  -Celebrex 200 mg BID with meals- Good Rx card provided to the patient.  -Gabapentin 300 mg Q8H   -Tylenol 1000 mg Q8H  -BactrimDS BID x 7 days   -Hibiclens - instructed the patient to wash breast and/or abdomen and margins the night before surgery or the morning of surgery; avoid washing face/eyes     RTC 4/15/25 after surgery     Scribe Attestation  By signing my name below, I, Edda Adilson, Olegarioibe, attest that this documentation has been prepared under the  direction and in the presence of Bella Chavez MD. Verbal consent obtained from the patient.      Attending Attestation:  IBella MD, personal performed the history, exam, and decision making on this patient.  A total of 30 mins were spent in patient counseling, review of medical records, and coordination of care.

## 2025-03-11 ENCOUNTER — APPOINTMENT (OUTPATIENT)
Dept: PLASTIC SURGERY | Facility: CLINIC | Age: 55
End: 2025-03-11
Payer: COMMERCIAL

## 2025-03-11 VITALS
BODY MASS INDEX: 22.16 KG/M2 | DIASTOLIC BLOOD PRESSURE: 57 MMHG | HEART RATE: 76 BPM | SYSTOLIC BLOOD PRESSURE: 92 MMHG | WEIGHT: 141.2 LBS | HEIGHT: 67 IN

## 2025-03-11 DIAGNOSIS — T85.43XD BREAST IMPLANT RUPTURE, SUBSEQUENT ENCOUNTER: ICD-10-CM

## 2025-03-11 DIAGNOSIS — C50.412 MALIGNANT NEOPLASM OF UPPER-OUTER QUADRANT OF LEFT BREAST IN FEMALE, ESTROGEN RECEPTOR NEGATIVE: ICD-10-CM

## 2025-03-11 DIAGNOSIS — Z17.1 MALIGNANT NEOPLASM OF UPPER-OUTER QUADRANT OF LEFT BREAST IN FEMALE, ESTROGEN RECEPTOR NEGATIVE: ICD-10-CM

## 2025-03-11 DIAGNOSIS — G89.18 POST-OP PAIN: ICD-10-CM

## 2025-03-11 PROCEDURE — RXMED WILLOW AMBULATORY MEDICATION CHARGE

## 2025-03-11 RX ORDER — ACETAMINOPHEN 500 MG
1000 TABLET ORAL EVERY 8 HOURS
Qty: 84 TABLET | Refills: 0 | Status: SHIPPED | OUTPATIENT
Start: 2025-03-11 | End: 2025-03-29

## 2025-03-11 RX ORDER — CELECOXIB 200 MG/1
200 CAPSULE ORAL 2 TIMES DAILY
Qty: 28 CAPSULE | Refills: 0 | Status: SHIPPED | OUTPATIENT
Start: 2025-03-11 | End: 2025-03-29

## 2025-03-11 RX ORDER — CHLORHEXIDINE GLUCONATE 40 MG/ML
SOLUTION TOPICAL ONCE
Qty: 118 ML | Refills: 0 | Status: SHIPPED | OUTPATIENT
Start: 2025-03-11 | End: 2025-03-16

## 2025-03-11 RX ORDER — SULFAMETHOXAZOLE AND TRIMETHOPRIM 800; 160 MG/1; MG/1
1 TABLET ORAL 2 TIMES DAILY
Qty: 28 TABLET | Refills: 0 | Status: SHIPPED | OUTPATIENT
Start: 2025-03-11 | End: 2025-03-29

## 2025-03-11 RX ORDER — GABAPENTIN 300 MG/1
300 CAPSULE ORAL EVERY 8 HOURS
Qty: 42 CAPSULE | Refills: 0 | Status: SHIPPED | OUTPATIENT
Start: 2025-03-11 | End: 2025-03-29

## 2025-03-12 LAB
ERYTHROCYTE [DISTWIDTH] IN BLOOD BY AUTOMATED COUNT: 12.3 % (ref 11–15)
HCT VFR BLD AUTO: 43.7 % (ref 35–45)
HGB BLD-MCNC: 13.9 G/DL (ref 11.7–15.5)
MCH RBC QN AUTO: 29.7 PG (ref 27–33)
MCHC RBC AUTO-ENTMCNC: 31.8 G/DL (ref 32–36)
MCV RBC AUTO: 93.4 FL (ref 80–100)
PLATELET # BLD AUTO: 327 THOUSAND/UL (ref 140–400)
PMV BLD REES-ECKER: 10.1 FL (ref 7.5–12.5)
PREALB SERPL-MCNC: 26 MG/DL (ref 17–34)
RBC # BLD AUTO: 4.68 MILLION/UL (ref 3.8–5.1)
WBC # BLD AUTO: 6 THOUSAND/UL (ref 3.8–10.8)

## 2025-03-15 ENCOUNTER — PHARMACY VISIT (OUTPATIENT)
Dept: PHARMACY | Facility: CLINIC | Age: 55
End: 2025-03-15
Payer: COMMERCIAL

## 2025-03-18 ENCOUNTER — APPOINTMENT (OUTPATIENT)
Dept: OPHTHALMOLOGY | Facility: CLINIC | Age: 55
End: 2025-03-18
Payer: COMMERCIAL

## 2025-03-25 ENCOUNTER — HOSPITAL ENCOUNTER (OUTPATIENT)
Dept: RADIOLOGY | Facility: HOSPITAL | Age: 55
Discharge: HOME | End: 2025-03-25
Payer: COMMERCIAL

## 2025-03-25 ENCOUNTER — APPOINTMENT (OUTPATIENT)
Dept: LAB | Facility: LAB | Age: 55
End: 2025-03-25
Payer: COMMERCIAL

## 2025-03-25 DIAGNOSIS — Z00.6 ENCOUNTER FOR EXAMINATION FOR NORMAL COMPARISON AND CONTROL IN CLINICAL RESEARCH PROGRAM: ICD-10-CM

## 2025-03-25 LAB
EST. AVERAGE GLUCOSE BLD GHB EST-MCNC: 105 MG/DL
HBA1C MFR BLD: 5.3 %
THROMBIN TIME: 14.3 SECONDS (ref 10.3–16.6)

## 2025-03-25 PROCEDURE — 83036 HEMOGLOBIN GLYCOSYLATED A1C: CPT | Performed by: INTERNAL MEDICINE

## 2025-03-25 PROCEDURE — 2550000001 HC RX 255 CONTRASTS: Performed by: INTERNAL MEDICINE

## 2025-03-25 PROCEDURE — 85670 THROMBIN TIME PLASMA: CPT | Performed by: INTERNAL MEDICINE

## 2025-03-25 PROCEDURE — 70553 MRI BRAIN STEM W/O & W/DYE: CPT

## 2025-03-25 PROCEDURE — A9575 INJ GADOTERATE MEGLUMI 0.1ML: HCPCS | Performed by: INTERNAL MEDICINE

## 2025-03-25 RX ORDER — GADOTERATE MEGLUMINE 376.9 MG/ML
15 INJECTION INTRAVENOUS
Status: COMPLETED | OUTPATIENT
Start: 2025-03-25 | End: 2025-03-25

## 2025-03-25 RX ADMIN — GADOTERATE MEGLUMINE 12 ML: 376.9 INJECTION INTRAVENOUS at 14:33

## 2025-04-03 ENCOUNTER — ANESTHESIA (OUTPATIENT)
Dept: OPERATING ROOM | Facility: CLINIC | Age: 55
End: 2025-04-03
Payer: COMMERCIAL

## 2025-04-03 ENCOUNTER — PREP FOR PROCEDURE (OUTPATIENT)
Dept: PLASTIC SURGERY | Facility: CLINIC | Age: 55
End: 2025-04-03

## 2025-04-03 ENCOUNTER — ANESTHESIA EVENT (OUTPATIENT)
Dept: OPERATING ROOM | Facility: HOSPITAL | Age: 55
End: 2025-04-03
Payer: COMMERCIAL

## 2025-04-03 ENCOUNTER — ANESTHESIA EVENT (OUTPATIENT)
Dept: OPERATING ROOM | Facility: CLINIC | Age: 55
End: 2025-04-03
Payer: COMMERCIAL

## 2025-04-03 ENCOUNTER — HOSPITAL ENCOUNTER (OUTPATIENT)
Facility: CLINIC | Age: 55
Setting detail: OUTPATIENT SURGERY
Discharge: HOME | End: 2025-04-03
Attending: SURGERY | Admitting: SURGERY
Payer: COMMERCIAL

## 2025-04-03 VITALS
HEART RATE: 68 BPM | SYSTOLIC BLOOD PRESSURE: 109 MMHG | DIASTOLIC BLOOD PRESSURE: 64 MMHG | TEMPERATURE: 97.2 F | HEIGHT: 67 IN | OXYGEN SATURATION: 98 % | BODY MASS INDEX: 21.8 KG/M2 | WEIGHT: 138.89 LBS | RESPIRATION RATE: 16 BRPM

## 2025-04-03 DIAGNOSIS — Z85.3 PERSONAL HISTORY OF BREAST CANCER: ICD-10-CM

## 2025-04-03 DIAGNOSIS — N65.1 BREAST ASYMMETRY FOLLOWING RECONSTRUCTIVE SURGERY: ICD-10-CM

## 2025-04-03 DIAGNOSIS — T85.43XS RUPTURED SILICONE BREAST IMPLANT, SEQUELA: ICD-10-CM

## 2025-04-03 DIAGNOSIS — T85.44XS CAPSULAR CONTRACTURE OF BREAST IMPLANT, SEQUELA: Primary | ICD-10-CM

## 2025-04-03 PROBLEM — T85.43XA RUPTURED SILICONE BREAST IMPLANT: Status: ACTIVE | Noted: 2025-04-03

## 2025-04-03 PROBLEM — T85.44XA CAPSULAR CONTRACTURE OF BREAST IMPLANT: Status: ACTIVE | Noted: 2025-04-03

## 2025-04-03 PROCEDURE — 2500000001 HC RX 250 WO HCPCS SELF ADMINISTERED DRUGS (ALT 637 FOR MEDICARE OP): Performed by: STUDENT IN AN ORGANIZED HEALTH CARE EDUCATION/TRAINING PROGRAM

## 2025-04-03 PROCEDURE — 2500000004 HC RX 250 GENERAL PHARMACY W/ HCPCS (ALT 636 FOR OP/ED): Performed by: STUDENT IN AN ORGANIZED HEALTH CARE EDUCATION/TRAINING PROGRAM

## 2025-04-03 PROCEDURE — 2500000004 HC RX 250 GENERAL PHARMACY W/ HCPCS (ALT 636 FOR OP/ED): Performed by: ANESTHESIOLOGIST ASSISTANT

## 2025-04-03 RX ORDER — ACETAMINOPHEN 325 MG/1
975 TABLET ORAL ONCE
Status: CANCELLED | OUTPATIENT
Start: 2025-04-03 | End: 2025-04-03

## 2025-04-03 RX ORDER — GABAPENTIN 300 MG/1
CAPSULE ORAL AS NEEDED
Status: DISCONTINUED | OUTPATIENT
Start: 2025-04-03 | End: 2025-04-04 | Stop reason: HOSPADM

## 2025-04-03 RX ORDER — ACETAMINOPHEN 325 MG/1
TABLET ORAL AS NEEDED
Status: DISCONTINUED | OUTPATIENT
Start: 2025-04-03 | End: 2025-04-04 | Stop reason: HOSPADM

## 2025-04-03 RX ORDER — SCOPOLAMINE 1 MG/3D
PATCH, EXTENDED RELEASE TRANSDERMAL AS NEEDED
Status: DISCONTINUED | OUTPATIENT
Start: 2025-04-03 | End: 2025-04-04 | Stop reason: HOSPADM

## 2025-04-03 RX ORDER — GABAPENTIN 300 MG/1
600 CAPSULE ORAL ONCE
Status: CANCELLED | OUTPATIENT
Start: 2025-04-03 | End: 2025-04-03

## 2025-04-03 RX ORDER — CEFAZOLIN SODIUM 2 G/100ML
2 INJECTION, SOLUTION INTRAVENOUS ONCE
Status: CANCELLED | OUTPATIENT
Start: 2025-04-03 | End: 2025-04-03

## 2025-04-03 RX ORDER — SODIUM CHLORIDE, SODIUM LACTATE, POTASSIUM CHLORIDE, CALCIUM CHLORIDE 600; 310; 30; 20 MG/100ML; MG/100ML; MG/100ML; MG/100ML
INJECTION, SOLUTION INTRAVENOUS CONTINUOUS PRN
Status: DISCONTINUED | OUTPATIENT
Start: 2025-04-03 | End: 2025-04-04 | Stop reason: HOSPADM

## 2025-04-03 RX ADMIN — SCOPOLAMINE 1 PATCH: 1.5 PATCH, EXTENDED RELEASE TRANSDERMAL at 10:29

## 2025-04-03 RX ADMIN — GABAPENTIN 300 MG: 300 CAPSULE ORAL at 10:29

## 2025-04-03 RX ADMIN — SODIUM CHLORIDE, POTASSIUM CHLORIDE, SODIUM LACTATE AND CALCIUM CHLORIDE: 600; 310; 30; 20 INJECTION, SOLUTION INTRAVENOUS at 10:40

## 2025-04-03 RX ADMIN — ACETAMINOPHEN 975 MG: 325 TABLET, FILM COATED ORAL at 10:29

## 2025-04-03 SDOH — HEALTH STABILITY: MENTAL HEALTH: CURRENT SMOKER: 0

## 2025-04-03 ASSESSMENT — PAIN - FUNCTIONAL ASSESSMENT: PAIN_FUNCTIONAL_ASSESSMENT: 0-10

## 2025-04-03 ASSESSMENT — PAIN SCALES - GENERAL: PAINLEVEL_OUTOF10: 0 - NO PAIN

## 2025-04-03 ASSESSMENT — COLUMBIA-SUICIDE SEVERITY RATING SCALE - C-SSRS
2. HAVE YOU ACTUALLY HAD ANY THOUGHTS OF KILLING YOURSELF?: NO
6. HAVE YOU EVER DONE ANYTHING, STARTED TO DO ANYTHING, OR PREPARED TO DO ANYTHING TO END YOUR LIFE?: NO

## 2025-04-03 NOTE — ANESTHESIA PREPROCEDURE EVALUATION
"Patient: Radha Tellez \"Tanya\"    Procedure Information       Date/Time: 04/03/25 1115    Procedure: REPLACEMENT, IMPLANT, BREAST (Bilateral: Breast)    [place] [facility] [place] [facility] [place] [facility]    Surgeons: Bella Chavez MD            Relevant Problems   Pulmonary   (+) Asthma      Neuro   (+) Anxiety   (+) Depression   (+) Other lesions of median nerve, unspecified upper limb      GI   (+) GI cancer   (+) Gastroesophageal reflux disease      Liver   (+) GI cancer       Clinical information reviewed:   Tobacco  Allergies  Meds   Med Hx  Surg Hx  OB Status  Fam Hx  Soc   Hx        NPO Detail:  NPO/Void Status  Date of Last Liquid: 04/02/25  Time of Last Liquid: 2300  Date of Last Solid: 04/02/25  Time of Last Solid: 2300         Physical Exam    Airway  Mallampati: I  TM distance: >3 FB  Neck ROM: full     Cardiovascular - normal exam     Dental        Pulmonary - normal exam     Abdominal - normal exam             Anesthesia Plan    History of general anesthesia?: yes  History of complications of general anesthesia?: no    ASA 2     general     The patient is not a current smoker.  Patient did not smoke on day of procedure.  Education provided regarding risk of obstructive sleep apnea.  intravenous induction   Postoperative administration of opioids is intended.  Trial extubation is planned.  Anesthetic plan and risks discussed with patient.    Plan discussed with attending.      "

## 2025-04-04 ENCOUNTER — ANESTHESIA (OUTPATIENT)
Dept: OPERATING ROOM | Facility: HOSPITAL | Age: 55
End: 2025-04-04
Payer: COMMERCIAL

## 2025-04-04 ENCOUNTER — HOSPITAL ENCOUNTER (OUTPATIENT)
Facility: HOSPITAL | Age: 55
Setting detail: OUTPATIENT SURGERY
Discharge: HOME | End: 2025-04-04
Attending: SURGERY | Admitting: SURGERY
Payer: COMMERCIAL

## 2025-04-04 VITALS
WEIGHT: 139.77 LBS | RESPIRATION RATE: 16 BRPM | SYSTOLIC BLOOD PRESSURE: 98 MMHG | HEART RATE: 90 BPM | HEIGHT: 68 IN | DIASTOLIC BLOOD PRESSURE: 54 MMHG | TEMPERATURE: 96.8 F | BODY MASS INDEX: 21.18 KG/M2 | OXYGEN SATURATION: 99 %

## 2025-04-04 DIAGNOSIS — T85.44XS CAPSULAR CONTRACTURE OF BREAST IMPLANT, SEQUELA: Primary | ICD-10-CM

## 2025-04-04 DIAGNOSIS — Z85.3 PERSONAL HISTORY OF BREAST CANCER: ICD-10-CM

## 2025-04-04 DIAGNOSIS — G89.18 POST-OP PAIN: ICD-10-CM

## 2025-04-04 DIAGNOSIS — N65.1 BREAST ASYMMETRY FOLLOWING RECONSTRUCTIVE SURGERY: ICD-10-CM

## 2025-04-04 DIAGNOSIS — Z17.1 MALIGNANT NEOPLASM OF UPPER-OUTER QUADRANT OF LEFT BREAST IN FEMALE, ESTROGEN RECEPTOR NEGATIVE: ICD-10-CM

## 2025-04-04 DIAGNOSIS — T85.43XS RUPTURED SILICONE BREAST IMPLANT, SEQUELA: ICD-10-CM

## 2025-04-04 DIAGNOSIS — C50.412 MALIGNANT NEOPLASM OF UPPER-OUTER QUADRANT OF LEFT BREAST IN FEMALE, ESTROGEN RECEPTOR NEGATIVE: ICD-10-CM

## 2025-04-04 PROBLEM — I10 HTN (HYPERTENSION): Status: ACTIVE | Noted: 2025-04-04

## 2025-04-04 PROBLEM — N20.0 RENAL CALCULI: Status: ACTIVE | Noted: 2025-04-04

## 2025-04-04 PROBLEM — E78.5 HYPERLIPIDEMIA: Status: ACTIVE | Noted: 2025-04-04

## 2025-04-04 PROBLEM — Z98.890 PONV (POSTOPERATIVE NAUSEA AND VOMITING): Status: ACTIVE | Noted: 2025-04-04

## 2025-04-04 PROBLEM — G89.29 CHRONIC NECK PAIN: Status: ACTIVE | Noted: 2024-05-16

## 2025-04-04 PROBLEM — C50.919 BREAST CANCER: Status: ACTIVE | Noted: 2025-04-04

## 2025-04-04 PROBLEM — M19.90 OSTEOARTHRITIS: Status: ACTIVE | Noted: 2025-04-04

## 2025-04-04 PROBLEM — R11.2 PONV (POSTOPERATIVE NAUSEA AND VOMITING): Status: ACTIVE | Noted: 2025-04-04

## 2025-04-04 PROCEDURE — 2720000007 HC OR 272 NO HCPCS: Performed by: SURGERY

## 2025-04-04 PROCEDURE — 2500000002 HC RX 250 W HCPCS SELF ADMINISTERED DRUGS (ALT 637 FOR MEDICARE OP, ALT 636 FOR OP/ED): Performed by: NURSE ANESTHETIST, CERTIFIED REGISTERED

## 2025-04-04 PROCEDURE — 19330 RMVL RUPTURED BREAST IMPLANT: CPT | Performed by: SURGERY

## 2025-04-04 PROCEDURE — 3700000002 HC GENERAL ANESTHESIA TIME - EACH INCREMENTAL 1 MINUTE: Performed by: SURGERY

## 2025-04-04 PROCEDURE — 7100000010 HC PHASE TWO TIME - EACH INCREMENTAL 1 MINUTE: Performed by: SURGERY

## 2025-04-04 PROCEDURE — P9045 ALBUMIN (HUMAN), 5%, 250 ML: HCPCS | Mod: JZ | Performed by: NURSE ANESTHETIST, CERTIFIED REGISTERED

## 2025-04-04 PROCEDURE — 2500000001 HC RX 250 WO HCPCS SELF ADMINISTERED DRUGS (ALT 637 FOR MEDICARE OP): Performed by: SURGERY

## 2025-04-04 PROCEDURE — 3600000003 HC OR TIME - INITIAL BASE CHARGE - PROCEDURE LEVEL THREE: Performed by: SURGERY

## 2025-04-04 PROCEDURE — 19370 REVJ PERI-IMPLT CAPSULE BRST: CPT | Performed by: SURGERY

## 2025-04-04 PROCEDURE — 19342 INSJ/RPLCMT BRST IMPLT SEP D: CPT | Performed by: SURGERY

## 2025-04-04 PROCEDURE — 2500000005 HC RX 250 GENERAL PHARMACY W/O HCPCS: Performed by: NURSE ANESTHETIST, CERTIFIED REGISTERED

## 2025-04-04 PROCEDURE — 19342 INSJ/RPLCMT BRST IMPLT SEP D: CPT | Performed by: PHYSICIAN ASSISTANT

## 2025-04-04 PROCEDURE — 2780000003 HC OR 278 NO HCPCS: Performed by: SURGERY

## 2025-04-04 PROCEDURE — 7100000002 HC RECOVERY ROOM TIME - EACH INCREMENTAL 1 MINUTE: Performed by: SURGERY

## 2025-04-04 PROCEDURE — C1789 PROSTHESIS, BREAST, IMP: HCPCS | Performed by: SURGERY

## 2025-04-04 PROCEDURE — 2500000005 HC RX 250 GENERAL PHARMACY W/O HCPCS: Performed by: SURGERY

## 2025-04-04 PROCEDURE — 7100000001 HC RECOVERY ROOM TIME - INITIAL BASE CHARGE: Performed by: SURGERY

## 2025-04-04 PROCEDURE — 3700000001 HC GENERAL ANESTHESIA TIME - INITIAL BASE CHARGE: Performed by: SURGERY

## 2025-04-04 PROCEDURE — 88300 SURGICAL PATH GROSS: CPT | Mod: TC,SUR | Performed by: SURGERY

## 2025-04-04 PROCEDURE — 2500000004 HC RX 250 GENERAL PHARMACY W/ HCPCS (ALT 636 FOR OP/ED): Performed by: SURGERY

## 2025-04-04 PROCEDURE — 7100000009 HC PHASE TWO TIME - INITIAL BASE CHARGE: Performed by: SURGERY

## 2025-04-04 PROCEDURE — 2500000004 HC RX 250 GENERAL PHARMACY W/ HCPCS (ALT 636 FOR OP/ED): Mod: JZ | Performed by: ANESTHESIOLOGY

## 2025-04-04 PROCEDURE — 3600000008 HC OR TIME - EACH INCREMENTAL 1 MINUTE - PROCEDURE LEVEL THREE: Performed by: SURGERY

## 2025-04-04 PROCEDURE — 2500000004 HC RX 250 GENERAL PHARMACY W/ HCPCS (ALT 636 FOR OP/ED): Performed by: NURSE ANESTHETIST, CERTIFIED REGISTERED

## 2025-04-04 DEVICE — IMPLANTABLE DEVICE: Type: IMPLANTABLE DEVICE | Site: BREAST | Status: FUNCTIONAL

## 2025-04-04 RX ORDER — CEFAZOLIN SODIUM 2 G/100ML
2 INJECTION, SOLUTION INTRAVENOUS ONCE
Status: DISCONTINUED | OUTPATIENT
Start: 2025-04-04 | End: 2025-04-04 | Stop reason: HOSPADM

## 2025-04-04 RX ORDER — CELECOXIB 200 MG/1
200 CAPSULE ORAL 2 TIMES DAILY
Qty: 28 CAPSULE | Refills: 0 | Status: SHIPPED | OUTPATIENT
Start: 2025-04-04 | End: 2025-04-18

## 2025-04-04 RX ORDER — DROPERIDOL 2.5 MG/ML
0.62 INJECTION, SOLUTION INTRAMUSCULAR; INTRAVENOUS ONCE AS NEEDED
Status: DISCONTINUED | OUTPATIENT
Start: 2025-04-04 | End: 2025-04-04 | Stop reason: HOSPADM

## 2025-04-04 RX ORDER — HYDROMORPHONE HYDROCHLORIDE 0.2 MG/ML
0.2 INJECTION INTRAMUSCULAR; INTRAVENOUS; SUBCUTANEOUS EVERY 5 MIN PRN
Status: DISCONTINUED | OUTPATIENT
Start: 2025-04-04 | End: 2025-04-04 | Stop reason: HOSPADM

## 2025-04-04 RX ORDER — HYDROMORPHONE HYDROCHLORIDE 1 MG/ML
INJECTION, SOLUTION INTRAMUSCULAR; INTRAVENOUS; SUBCUTANEOUS AS NEEDED
Status: DISCONTINUED | OUTPATIENT
Start: 2025-04-04 | End: 2025-04-04

## 2025-04-04 RX ORDER — FENTANYL CITRATE 50 UG/ML
INJECTION, SOLUTION INTRAMUSCULAR; INTRAVENOUS AS NEEDED
Status: DISCONTINUED | OUTPATIENT
Start: 2025-04-04 | End: 2025-04-04

## 2025-04-04 RX ORDER — BUPIVACAINE HCL/EPINEPHRINE 0.5-1:200K
VIAL (ML) INJECTION AS NEEDED
Status: DISCONTINUED | OUTPATIENT
Start: 2025-04-04 | End: 2025-04-04 | Stop reason: HOSPADM

## 2025-04-04 RX ORDER — LIDOCAINE HYDROCHLORIDE 20 MG/ML
INJECTION, SOLUTION INFILTRATION; PERINEURAL AS NEEDED
Status: DISCONTINUED | OUTPATIENT
Start: 2025-04-04 | End: 2025-04-04

## 2025-04-04 RX ORDER — ALBUMIN HUMAN 50 G/1000ML
SOLUTION INTRAVENOUS AS NEEDED
Status: DISCONTINUED | OUTPATIENT
Start: 2025-04-04 | End: 2025-04-04

## 2025-04-04 RX ORDER — NORETHINDRONE AND ETHINYL ESTRADIOL 0.5-0.035
KIT ORAL AS NEEDED
Status: DISCONTINUED | OUTPATIENT
Start: 2025-04-04 | End: 2025-04-04

## 2025-04-04 RX ORDER — ACETAMINOPHEN 500 MG
1000 TABLET ORAL EVERY 8 HOURS
Qty: 84 TABLET | Refills: 0 | Status: SHIPPED | OUTPATIENT
Start: 2025-04-04 | End: 2025-04-18

## 2025-04-04 RX ORDER — PROPOFOL 10 MG/ML
INJECTION, EMULSION INTRAVENOUS AS NEEDED
Status: DISCONTINUED | OUTPATIENT
Start: 2025-04-04 | End: 2025-04-04

## 2025-04-04 RX ORDER — SODIUM CHLORIDE, SODIUM LACTATE, POTASSIUM CHLORIDE, CALCIUM CHLORIDE 600; 310; 30; 20 MG/100ML; MG/100ML; MG/100ML; MG/100ML
INJECTION, SOLUTION INTRAVENOUS CONTINUOUS PRN
Status: DISCONTINUED | OUTPATIENT
Start: 2025-04-04 | End: 2025-04-04

## 2025-04-04 RX ORDER — GLYCOPYRROLATE 0.2 MG/ML
INJECTION INTRAMUSCULAR; INTRAVENOUS AS NEEDED
Status: DISCONTINUED | OUTPATIENT
Start: 2025-04-04 | End: 2025-04-04

## 2025-04-04 RX ORDER — SODIUM CHLORIDE 9 MG/ML
INJECTION, SOLUTION INTRAMUSCULAR; INTRAVENOUS; SUBCUTANEOUS AS NEEDED
Status: DISCONTINUED | OUTPATIENT
Start: 2025-04-04 | End: 2025-04-04 | Stop reason: HOSPADM

## 2025-04-04 RX ORDER — HYDROMORPHONE HYDROCHLORIDE 0.2 MG/ML
0.1 INJECTION INTRAMUSCULAR; INTRAVENOUS; SUBCUTANEOUS EVERY 5 MIN PRN
Status: DISCONTINUED | OUTPATIENT
Start: 2025-04-04 | End: 2025-04-04 | Stop reason: HOSPADM

## 2025-04-04 RX ORDER — SODIUM CHLORIDE 0.9 G/100ML
INJECTION, SOLUTION IRRIGATION AS NEEDED
Status: DISCONTINUED | OUTPATIENT
Start: 2025-04-04 | End: 2025-04-04 | Stop reason: HOSPADM

## 2025-04-04 RX ORDER — SULFAMETHOXAZOLE AND TRIMETHOPRIM 800; 160 MG/1; MG/1
1 TABLET ORAL 2 TIMES DAILY
Qty: 20 TABLET | Refills: 0 | Status: SHIPPED | OUTPATIENT
Start: 2025-04-04 | End: 2025-04-14

## 2025-04-04 RX ORDER — APREPITANT 40 MG/1
CAPSULE ORAL AS NEEDED
Status: DISCONTINUED | OUTPATIENT
Start: 2025-04-04 | End: 2025-04-04

## 2025-04-04 RX ORDER — ACETAMINOPHEN 325 MG/1
975 TABLET ORAL ONCE
Status: DISCONTINUED | OUTPATIENT
Start: 2025-04-04 | End: 2025-04-04 | Stop reason: HOSPADM

## 2025-04-04 RX ORDER — KETOROLAC TROMETHAMINE 30 MG/ML
INJECTION, SOLUTION INTRAMUSCULAR; INTRAVENOUS AS NEEDED
Status: DISCONTINUED | OUTPATIENT
Start: 2025-04-04 | End: 2025-04-04

## 2025-04-04 RX ORDER — PHENYLEPHRINE HCL IN 0.9% NACL 0.4MG/10ML
SYRINGE (ML) INTRAVENOUS AS NEEDED
Status: DISCONTINUED | OUTPATIENT
Start: 2025-04-04 | End: 2025-04-04

## 2025-04-04 RX ORDER — LIDOCAINE HYDROCHLORIDE 10 MG/ML
0.1 INJECTION, SOLUTION INFILTRATION; PERINEURAL ONCE
Status: DISCONTINUED | OUTPATIENT
Start: 2025-04-04 | End: 2025-04-04 | Stop reason: HOSPADM

## 2025-04-04 RX ORDER — MIDAZOLAM HYDROCHLORIDE 1 MG/ML
INJECTION INTRAMUSCULAR; INTRAVENOUS AS NEEDED
Status: DISCONTINUED | OUTPATIENT
Start: 2025-04-04 | End: 2025-04-04

## 2025-04-04 RX ORDER — ROCURONIUM BROMIDE 10 MG/ML
INJECTION, SOLUTION INTRAVENOUS AS NEEDED
Status: DISCONTINUED | OUTPATIENT
Start: 2025-04-04 | End: 2025-04-04

## 2025-04-04 RX ORDER — CEFAZOLIN 1 G/1
INJECTION, POWDER, FOR SOLUTION INTRAVENOUS AS NEEDED
Status: DISCONTINUED | OUTPATIENT
Start: 2025-04-04 | End: 2025-04-04

## 2025-04-04 RX ORDER — ACETAMINOPHEN 10 MG/ML
INJECTION, SOLUTION INTRAVENOUS AS NEEDED
Status: DISCONTINUED | OUTPATIENT
Start: 2025-04-04 | End: 2025-04-04

## 2025-04-04 RX ORDER — GABAPENTIN 300 MG/1
600 CAPSULE ORAL ONCE
Status: COMPLETED | OUTPATIENT
Start: 2025-04-04 | End: 2025-04-04

## 2025-04-04 RX ORDER — CLINDAMYCIN PHOSPHATE 900 MG/50ML
INJECTION, SOLUTION INTRAVENOUS CONTINUOUS PRN
Status: COMPLETED | OUTPATIENT
Start: 2025-04-04 | End: 2025-04-04

## 2025-04-04 RX ADMIN — Medication 40 MCG: at 10:01

## 2025-04-04 RX ADMIN — PROPOFOL 150 MG: 10 INJECTION, EMULSION INTRAVENOUS at 08:07

## 2025-04-04 RX ADMIN — Medication 120 MCG: at 09:16

## 2025-04-04 RX ADMIN — PROPOFOL 30 MG: 10 INJECTION, EMULSION INTRAVENOUS at 12:43

## 2025-04-04 RX ADMIN — Medication 80 MCG: at 09:56

## 2025-04-04 RX ADMIN — SUGAMMADEX 200 MG: 100 INJECTION, SOLUTION INTRAVENOUS at 12:47

## 2025-04-04 RX ADMIN — HYDROMORPHONE HYDROCHLORIDE 0.4 MG: 1 INJECTION, SOLUTION INTRAMUSCULAR; INTRAVENOUS; SUBCUTANEOUS at 13:05

## 2025-04-04 RX ADMIN — EPHEDRINE SULFATE 5 MG: 50 INJECTION, SOLUTION INTRAVENOUS at 10:01

## 2025-04-04 RX ADMIN — HYDROMORPHONE HYDROCHLORIDE 0.4 MG: 1 INJECTION, SOLUTION INTRAMUSCULAR; INTRAVENOUS; SUBCUTANEOUS at 11:50

## 2025-04-04 RX ADMIN — Medication 160 MCG: at 08:07

## 2025-04-04 RX ADMIN — Medication 80 MCG: at 10:46

## 2025-04-04 RX ADMIN — ROCURONIUM 20 MG: 50 INJECTION, SOLUTION INTRAVENOUS at 09:30

## 2025-04-04 RX ADMIN — HYDROMORPHONE HYDROCHLORIDE 0.2 MG: 0.2 INJECTION, SOLUTION INTRAMUSCULAR; INTRAVENOUS; SUBCUTANEOUS at 13:49

## 2025-04-04 RX ADMIN — APREPITANT 40 MG: 40 CAPSULE ORAL at 07:54

## 2025-04-04 RX ADMIN — ROCURONIUM 20 MG: 50 INJECTION, SOLUTION INTRAVENOUS at 10:04

## 2025-04-04 RX ADMIN — ACETAMINOPHEN 1000 MG: 10 INJECTION, SOLUTION INTRAVENOUS at 08:29

## 2025-04-04 RX ADMIN — Medication 40 MCG: at 11:23

## 2025-04-04 RX ADMIN — CEFAZOLIN 2 G: 1 INJECTION, POWDER, FOR SOLUTION INTRAMUSCULAR; INTRAVENOUS at 08:12

## 2025-04-04 RX ADMIN — EPHEDRINE SULFATE 10 MG: 50 INJECTION, SOLUTION INTRAVENOUS at 10:26

## 2025-04-04 RX ADMIN — FENTANYL CITRATE 100 MCG: 50 INJECTION, SOLUTION INTRAMUSCULAR; INTRAVENOUS at 08:07

## 2025-04-04 RX ADMIN — EPHEDRINE SULFATE 5 MG: 50 INJECTION, SOLUTION INTRAVENOUS at 10:32

## 2025-04-04 RX ADMIN — KETOROLAC TROMETHAMINE 30 MG: 30 INJECTION, SOLUTION INTRAMUSCULAR; INTRAVENOUS at 12:11

## 2025-04-04 RX ADMIN — GABAPENTIN 600 MG: 300 CAPSULE ORAL at 07:37

## 2025-04-04 RX ADMIN — Medication 120 MCG: at 08:32

## 2025-04-04 RX ADMIN — Medication 80 MCG: at 11:22

## 2025-04-04 RX ADMIN — ROCURONIUM 20 MG: 50 INJECTION, SOLUTION INTRAVENOUS at 08:36

## 2025-04-04 RX ADMIN — Medication 80 MCG: at 09:13

## 2025-04-04 RX ADMIN — HYDROMORPHONE HYDROCHLORIDE 0.2 MG: 1 INJECTION, SOLUTION INTRAMUSCULAR; INTRAVENOUS; SUBCUTANEOUS at 12:14

## 2025-04-04 RX ADMIN — Medication 120 MCG: at 08:16

## 2025-04-04 RX ADMIN — GLYCOPYRROLATE 0.2 MG: 0.2 INJECTION INTRAMUSCULAR; INTRAVENOUS at 08:40

## 2025-04-04 RX ADMIN — Medication 80 MCG: at 09:41

## 2025-04-04 RX ADMIN — Medication 80 MCG: at 08:34

## 2025-04-04 RX ADMIN — SODIUM CHLORIDE, POTASSIUM CHLORIDE, SODIUM LACTATE AND CALCIUM CHLORIDE: 600; 310; 30; 20 INJECTION, SOLUTION INTRAVENOUS at 07:54

## 2025-04-04 RX ADMIN — Medication 120 MCG: at 09:07

## 2025-04-04 RX ADMIN — EPHEDRINE SULFATE 10 MG: 50 INJECTION, SOLUTION INTRAVENOUS at 09:59

## 2025-04-04 RX ADMIN — MIDAZOLAM HYDROCHLORIDE 2 MG: 2 INJECTION, SOLUTION INTRAMUSCULAR; INTRAVENOUS at 08:03

## 2025-04-04 RX ADMIN — ROCURONIUM 20 MG: 50 INJECTION, SOLUTION INTRAVENOUS at 10:35

## 2025-04-04 RX ADMIN — EPHEDRINE SULFATE 10 MG: 50 INJECTION, SOLUTION INTRAVENOUS at 10:30

## 2025-04-04 RX ADMIN — Medication 120 MCG: at 11:37

## 2025-04-04 RX ADMIN — ROCURONIUM 50 MG: 50 INJECTION, SOLUTION INTRAVENOUS at 08:08

## 2025-04-04 RX ADMIN — Medication 80 MCG: at 10:28

## 2025-04-04 RX ADMIN — CEFAZOLIN 2 G: 1 INJECTION, POWDER, FOR SOLUTION INTRAMUSCULAR; INTRAVENOUS at 12:12

## 2025-04-04 RX ADMIN — Medication 80 MCG: at 11:46

## 2025-04-04 RX ADMIN — LIDOCAINE HYDROCHLORIDE 100 MG: 20 INJECTION, SOLUTION INFILTRATION; PERINEURAL at 08:07

## 2025-04-04 RX ADMIN — DEXAMETHASONE SODIUM PHOSPHATE 8 MG: 4 INJECTION INTRA-ARTICULAR; INTRALESIONAL; INTRAMUSCULAR; INTRAVENOUS; SOFT TISSUE at 08:07

## 2025-04-04 RX ADMIN — Medication 120 MCG: at 11:07

## 2025-04-04 RX ADMIN — ROCURONIUM 20 MG: 50 INJECTION, SOLUTION INTRAVENOUS at 11:11

## 2025-04-04 RX ADMIN — Medication 120 MCG: at 08:43

## 2025-04-04 RX ADMIN — EPHEDRINE SULFATE 10 MG: 50 INJECTION, SOLUTION INTRAVENOUS at 10:55

## 2025-04-04 RX ADMIN — ALBUMIN HUMAN 250 ML: 0.05 INJECTION, SOLUTION INTRAVENOUS at 09:15

## 2025-04-04 SDOH — HEALTH STABILITY: MENTAL HEALTH: CURRENT SMOKER: 0

## 2025-04-04 ASSESSMENT — PAIN - FUNCTIONAL ASSESSMENT
PAIN_FUNCTIONAL_ASSESSMENT: 0-10

## 2025-04-04 ASSESSMENT — PAIN SCALES - GENERAL
PAINLEVEL_OUTOF10: 4
PAINLEVEL_OUTOF10: 0 - NO PAIN
PAINLEVEL_OUTOF10: 1
PAINLEVEL_OUTOF10: 0 - NO PAIN
PAINLEVEL_OUTOF10: 4
PAINLEVEL_OUTOF10: 1
PAIN_LEVEL: 0
PAINLEVEL_OUTOF10: 0 - NO PAIN
PAINLEVEL_OUTOF10: 1

## 2025-04-04 ASSESSMENT — COLUMBIA-SUICIDE SEVERITY RATING SCALE - C-SSRS
1. IN THE PAST MONTH, HAVE YOU WISHED YOU WERE DEAD OR WISHED YOU COULD GO TO SLEEP AND NOT WAKE UP?: NO
6. HAVE YOU EVER DONE ANYTHING, STARTED TO DO ANYTHING, OR PREPARED TO DO ANYTHING TO END YOUR LIFE?: NO
2. HAVE YOU ACTUALLY HAD ANY THOUGHTS OF KILLING YOURSELF?: NO

## 2025-04-04 ASSESSMENT — PAIN DESCRIPTION - LOCATION: LOCATION: CHEST

## 2025-04-04 NOTE — DISCHARGE INSTRUCTIONS
Plastic and Reconstructive Surgery Post Operative Instructions  You had surgery today at [facility] with Dr. Chavez of plastic and reconstructive surgery. Included below are post-operative instructions and details regarding follow-up.     Thank you for allowing us to participate in your care and we wish you the best!    Best Regards,  [facility]  Department of Plastic and Reconstructive Surgery    Activity:  Please avoid strenuous activity. Start walking short distances as soon as possible, as this helps to reduce swelling and lowers the chance of blood clots . No pushing, pulling or lifting objects greater than 5 pounds .     You may not  shower while dressings are in place. Sponge bath only.  Avoid soaking or submerging surgical incisions/sites or wetting wound vac dressing or device .      Surgical Site/Wound Care:  wound care recs    Please leave dressings in place until seen in office. Bring your pink bra with you.      If you experience any issues with your dressing issues please refer to the provided instruction manual or contact the plastic surgery office at [phone].      Avoid application of creams, lotions or ointments to surgical site, no soaking or scrubbing of surgical sites.     Please do not apply ice or heat directly to the skin as feeling to the area may be diminished.    Please notify our office immediately if developing signs of infection which include increased redness, swelling, fever/chills, green/yellow drainage, or foul odor from wound. Plastic Surgery office line: [phone].      3 weeks  after surgery you may begin to massage your incisions with body lotion, BioOil, or scar cream. Do not use 100% vitamin E as it can cause skin irritation.    Avoid exposing scars to sun for at least 12 months. Always use a strong sunblock if sun exposure is unavoidable (SPF 30 or greater).    Drain care:  You are being discharged with LEXUS drains.  Please empty and record the output every 8 hours or as needed. To empty the drain, open the cap, tip into cup and squeeze to empty. Squeeze drain flat then replace the cap.  Please empty the drain and record its output 3 times a day and bring these numbers to your follow up appointment.  The drain output should decrease and the color of the drainage should become lighter (red to pink to yellow).  This drain is sutured into place.  Keep the area around the drain clean and dry.  You may use mild soap and water to cleanse around the drain.   do not soak in a tub. Call the office if you notice drastic changes in drain output, bloody drain output, or redness/drainage around insertion site.    Nutrition:  You may resume a regular diet following surgery with increased protein. Ensure that you are drinking an adequate amount of fluids to maintain hydration as well as consuming a diet high in protein and low in sugar. You may consider increasing your fiber intake to avoid constipation.    Do not smoke, as smoking delays healing and increases the risk of complications. Also be sure you are not around people that smoke for at least 6 weeks after surgery.  Second hand smoke is just as harmful as if you were to smoke.    Medication Instructions:  You may resume use of your home prescribed mediations as previously directed following discharge from the hospital. If you were taking medications prior to your surgery and they are not listed on your discharge homegoing instructions medications list, consult your MD before you resume these medications.      Some postoperative pain is not unusual. This is usually relieved by taking prescribed or over the counter Acetaminophen/Tylenol, Celebrex and Gabapentin.  Severe pain despite administration of pain medication must be reported to your physician.    Remember when taking Acetaminophen, do NOT exceed more than 1000 milligrams (mg) per dose or more than 4000 mg total per day. Taking too  much Acetaminophen at one time can damage your liver. The maximum amount of ibuprofen in adults is 800 mg per dose or 3200 mg per day. Call your MD if you have any questions about your medications. To prevent constipation while taking narcotic pain medications, please utilize your prescribed bowel regimen, ensure that you drink plenty of water, eat fiber rich foods (a good source is fruit) and increase activity progressively.    DO NOT drive a car while utilizing narcotic pain medications and until cleared by MD at follow-up appointment. Driving or operating heavy machinery, lawnmowers or power tools while taking opiod/narcotic pain medications may impair your judgement.    Call Physician If:  Call your MD or seek immediate medical attention if you experience any of the following symptoms:  1. Fever of 101.5 (38.5 C) or greater  2. Pain not controlled with prescribed pain medications  3. Uncontrolled nausea and/or vomiting  4. Drainage or swelling around your incisions and/or surgical sites   5. Separation of incisions, or tearing of the incision line  6. Large fluid collection under or around the incision or flap sites   7. Flap discoloration (including darkened appearance)  8. Difficulty breathing  9. Swelling, pain, heat and/or redness in your legs and/or calves  10. Inability to tolerate diet/fluid intake    Contact the plastic surgery office for any questions and/or concerns regarding the surgical incision/site.  1. [phone] if Monday-Friday (8 a.m. - 4:30 p.m.)  2. [phone] and ask for the Plastic Surgeon karine if after hours or on weekends  3. Please send a picture with any wound concerns to our plastic surgery email at [email or web address]    Follow-up/Post Discharge Appointments:  Follow-up care is a key part of your treatment and safety. It is very important that you maintain follow-up care as directed so that your surgical site heals properly and does not lead to problems. Always  carry a current medication list with you and bring it to ALL healthcare Provider visits. Be sure to maintain follow up with plastic surgery at your scheduled appointment. If you are unable to keep your appointment, or need to reschedule please contact our office at [phone].

## 2025-04-04 NOTE — ANESTHESIA PROCEDURE NOTES
Airway  Date/Time: 4/4/2025 8:10 AM  Urgency: elective    Airway not difficult    Staffing  Performed: attending and CRNA   Authorized by: Chloé Craig MD    Performed by: NALINI Weber-ELISE  Patient location during procedure: OR    Indications and Patient Condition  Indications for airway management: anesthesia and airway protection  Spontaneous Ventilation: absent  Sedation level: deep  Preoxygenated: yes  Patient position: sniffing  Mask difficulty assessment: 1 - vent by mask  Planned trial extubation    Final Airway Details  Final airway type: endotracheal airway      Successful airway: ETT  Cuffed: yes   Successful intubation technique: video laryngoscopy  Facilitating devices/methods: intubating stylet  Endotracheal tube insertion site: oral  Blade size: #4  ETT size (mm): 7.0  Cormack-Lehane Classification: grade I - full view of glottis  Placement verified by: chest auscultation, capnometry and palpation of cuff   Measured from: lips  ETT to lips (cm): 22  Number of attempts at approach: 1    Additional Comments  Intubated with Howell-pt has 2 front caps. Lips and teeth- in preinduction condition.

## 2025-04-04 NOTE — ANESTHESIA PREPROCEDURE EVALUATION
"Patient: Radha Tellez \"Tanya\"    Procedure Information      Date/Time: 04/04/25 0700   Procedure: REVISION, RECONSTRUCTION, BREAST, WITH IMPLANT INSERTION (Bilateral: Breast)   Location: [facility] OR 04 / [facility] OR   Surgeons: Bella Chavez MD         Relevant Problems   Anesthesia   (+) PONV (postoperative nausea and vomiting)      Cardiac   (+) HTN (hypertension)   (+) Hyperlipidemia      Pulmonary   (+) Asthma      Neuro   (+) Anxiety   (+) Depression   (+) Other lesions of median nerve, unspecified upper limb      GI   (+) GI cancer   (+) Gastroesophageal reflux disease      /Renal   (+) Renal calculi      Liver   (+) GI cancer      Endocrine (within normal limits)      Hematology (within normal limits)      Musculoskeletal   (+) Chronic neck pain   (+) Osteoarthritis (osteoporosis)      HEENT (within normal limits)      ID (within normal limits)      Skin (within normal limits)      GYN   (+) Breast cancer (With breast implant rupture, s/p chemo)       Clinical information reviewed:                   NPO Detail:  NPO/Void Status  Date of Last Liquid: 04/02/25  Time of Last Liquid: 2300  Date of Last Solid: 04/02/25  Time of Last Solid: 2300         Physical Exam    Airway  Mallampati: I  TM distance: >3 FB  Neck ROM: full     Cardiovascular - normal exam  Rate: normal     Dental   (+) implants       Pulmonary - normal exam  Breath sounds clear to auscultation     Abdominal - normal exam  Abdomen: soft  Bowel sounds: normal           Anesthesia Plan    History of general anesthesia?: yes  History of complications of general anesthesia?: no    ASA 3     general     The patient is not a current smoker.  Patient did not smoke on day of procedure.  Education provided regarding risk of obstructive sleep apnea.  intravenous induction   Postoperative administration of opioids is intended.  Trial extubation is planned.  Anesthetic plan and risks discussed with patient.  Use of blood products " discussed with patient who consented to blood products.    Plan discussed with attending and CRNA.

## 2025-04-04 NOTE — BRIEF OP NOTE
"Date: 2025  OR Location: [facility] OR    Name: Radha Tellez \"Tanya\", : 1970, Age: 54 y.o., MRN: [id number], Sex: female    Diagnosis  Pre-op Diagnosis      * Capsular contracture of breast implant, sequela [T85.44XS]     * Ruptured silicone breast implant, sequela [T85.43XS]     * Breast asymmetry following reconstructive surgery [N65.1]     * Personal history of breast cancer [Z85.3] Post-op Diagnosis     * Capsular contracture of breast implant, sequela [T85.44XS]     * Ruptured silicone breast implant, sequela [T85.43XS]     * Breast asymmetry following reconstructive surgery [N65.1]     * Personal history of breast cancer [Z85.3]     Procedures  REVISION, RECONSTRUCTION, BREAST, WITH IMPLANT INSERTION  72440 - [place] INSJ/RPLCMT BREAST IMPLANT SEP DAY MASTECTOMY    REVISION, RECONSTRUCTION, BREAST, WITH IMPLANT INSERTION  25539 - [place] REVISION UMESH-IMPLANT CAPSULE BREAST    REVISION, RECONSTRUCTION, BREAST, WITH IMPLANT INSERTION  48333 - [place] RMVL RUPTURED BREAST IMPLANT W/IMPLANT CONTENTS      Surgeons      * Bella Chavez - Primary    Resident/Fellow/Other Assistant:  Radha Douglas PA-C    Staff:   Circulator: Socrates Gruber Person: Christel Garces Scrub: Kathryn Garces Circulator: Kathryn    Anesthesia Staff: Anesthesiologist: Chloé Craig MD; Tiburcio Torrez MD  CRNA: FRANCES Weber  C-AA: KARMA Matthews    Procedure Summary  Anesthesia: General  ASA: III  Estimated Blood Loss: 25 mL  Intra-op Medications:   Administrations occurring from 0700 to 0930 on 25:   Medication Name Total Dose   BUPivacaine-EPINEPHrine (Marcaine w/EPI) 0.5 %-1:200,000 injection 37 mL   clindamycin (Cleocin) in dextrose 5% IV 50 mL   sodium chloride 0.9 % irrigation solution 1,500 mL   sodium chloride (PF) 0.9% solution 70 mL   acetaminophen (Ofirmev) injection 1,000 mg   albumin human bottle 5% 250 mL   aprepitant (Emend) capsule 40 mg   ceFAZolin (Ancef) vial 1 g 2 g   dexAMETHasone (Decadron) " 4 mg/mL 8 mg   fentaNYL (Sublimaze) injection 50 mcg/mL 100 mcg   glycopyrrolate (Robinul) injection 0.2 mg   LR infusion Cannot be calculated   lidocaine (Xylocaine) injection 2 % 100 mg   midazolam PF (Versed) injection 1 mg/mL 2 mg   phenylephrine 40 mcg/mL syringe 10 mL 920 mcg   propofol (Diprivan) injection 10 mg/mL 150 mg   rocuronium (ZeMuron) 50 mg/5 mL injection 90 mg   gabapentin (Neurontin) capsule 600 mg 600 mg              Anesthesia Record               Intraprocedure I/O Totals          Intake    LR infusion 1000.00 mL    Total Intake 1000 mL       Output    Urine 350 mL    Est. Blood Loss 75 mL    Total Output 425 mL       Net    Net Volume 575 mL          Specimen:   ID Type Source Tests Collected by Time   1 : RIGHT RUPTURED BREAST IMPLANT Tissue BREAST IMPLANT RIGHT SURGICAL PATHOLOGY EXAM Bella Chavez MD 4/4/2025 1034   2 : LEFT RUPTURED BREAST IMPLANT Tissue BREAST IMPLANT LEFT SURGICAL PATHOLOGY EXAM Bella Chavez MD 4/4/2025 1036   3 : RIGHT BREAST CAPSULE Tissue BREAST CAPSULE RIGHT SURGICAL PATHOLOGY EXAM Bella Chavez MD 4/4/2025 1213   4 : RIGHT MASTECTOMY SCAR Tissue BREAST SCAR RIGHT SURGICAL PATHOLOGY EXAM Bella Chavez MD 4/4/2025 1214                  Findings: Bilateral ruptured implants (Sientra 371cc)    Complications:  None; patient tolerated the procedure well.     Disposition: PACU - hemodynamically stable.  Condition: stable  Specimens Collected:   ID Type Source Tests Collected by Time   1 : RIGHT RUPTURED BREAST IMPLANT Tissue BREAST IMPLANT RIGHT SURGICAL PATHOLOGY EXAM Bella Chavez MD 4/4/2025 1034   2 : LEFT RUPTURED BREAST IMPLANT Tissue BREAST IMPLANT LEFT SURGICAL PATHOLOGY EXAM Bella Chavez MD 4/4/2025 1036   3 : RIGHT BREAST CAPSULE Tissue BREAST CAPSULE RIGHT SURGICAL PATHOLOGY EXAM Bella Chavez MD 4/4/2025 1213   4 : RIGHT MASTECTOMY SCAR Tissue BREAST SCAR RIGHT SURGICAL PATHOLOGY EXAM Bella Chavez MD 4/4/2025 1214     Attending Attestation: A qualified  resident physician was not available.    Bella Chavez  Phone Number: [phone]

## 2025-04-05 NOTE — OP NOTE
"REVISION, RECONSTRUCTION, BREAST, WITH IMPLANT INSERTION (B) Operative Note     Date: 2025  OR Location: [facility] OR    Name: Radha Tellez \"Tanya\", : 1970, Age: 54 y.o., MRN: [id number], Sex: female    Diagnosis  Pre-op Diagnosis      * Capsular contracture of breast implant, sequela [T85.44XS]     * Ruptured silicone breast implant, sequela [T85.43XS]     * Breast asymmetry following reconstructive surgery [N65.1]     * Personal history of breast cancer [Z85.3] Post-op Diagnosis     * Capsular contracture of breast implant, sequela [T85.44XS]     * Ruptured silicone breast implant, sequela [T85.43XS]     * Breast asymmetry following reconstructive surgery [N65.1]     * Personal history of breast cancer [Z85.3]     Procedures  REVISION, RECONSTRUCTION, Bilatearal BREAST, WITH IMPLANT INSERTION  42217.50 - [place] INSJ/RPLCMT BREAST IMPLANT SEP DAY MASTECTOMY  13767.22.50 - [place] REVISION UMESH-IMPLANT CAPSULE BREAST  43482.50 - [place] RMVL RUPTURED BREAST IMPLANT W/IMPLANT CONTENTS    Surgeons      * Bella Chavez - Primary    Resident/Fellow/Other Assistant:  Radha Douglas PA-C    There was no skilled surgical resident assistance available.  The Surgical Assistant was necessary to assist due to the nature of the case and difficulty.  Specifically, the ABDIRASHID was assisted with soft tissue handling, retraction, hemostasis and closure in order to successfully perform and complete the procedure.      Staff:   Circulator: Socrates Gruber Person: Christel Garces Scrub: Kathryn Garces Circulator: Kathryn    Anesthesia Staff: Anesthesiologist: Chloé Craig MD; Tiburcio Torrez MD  CRNA: FRANCES Weber  C-AA: KARMA Matthews    Procedure Summary  Anesthesia: General  ASA: III  Estimated Blood Loss: 75 mL  Intra-op Medications:   Administrations occurring from 0700 to 0930 on 25:   Medication Name Total Dose   BUPivacaine-EPINEPHrine (Marcaine w/EPI) 0.5 %-1:200,000 injection 37 mL   clindamycin " "(Cleocin) in dextrose 5% IV Cannot be calculated   sodium chloride 0.9 % irrigation solution 1,500 mL   sodium chloride (PF) 0.9% solution 70 mL   gabapentin (Neurontin) capsule 600 mg 600 mg   acetaminophen (Ofirmev) injection 1,000 mg   albumin human bottle 5% 250 mL   aprepitant (Emend) capsule 40 mg   ceFAZolin (Ancef) vial 1 g 2 g   dexAMETHasone (Decadron) 4 mg/mL 8 mg   fentaNYL (Sublimaze) injection 50 mcg/mL 100 mcg   glycopyrrolate (Robinul) injection 0.2 mg   LR infusion Cannot be calculated   lidocaine (Xylocaine) injection 2 % 100 mg   midazolam PF (Versed) injection 1 mg/mL 2 mg   phenylephrine 40 mcg/mL syringe 10 mL 920 mcg   propofol (Diprivan) injection 10 mg/mL 150 mg   rocuronium (ZeMuron) 50 mg/5 mL injection 90 mg              Anesthesia Record               Intraprocedure I/O Totals          Intake    LR infusion 1400.00 mL    Total Intake 1400 mL       Output    Urine 350 mL    Est. Blood Loss 75 mL    Total Output 425 mL       Net    Net Volume 975 mL          Specimen:   ID Type Source Tests Collected by Time   1 : RIGHT RUPTURED BREAST IMPLANT Tissue BREAST IMPLANT RIGHT SURGICAL PATHOLOGY EXAM Bella Chavez MD 4/4/2025 1034   2 : LEFT RUPTURED BREAST IMPLANT Tissue BREAST IMPLANT LEFT SURGICAL PATHOLOGY EXAM Bella Chavez MD 4/4/2025 1036   3 : RIGHT BREAST CAPSULE Tissue BREAST CAPSULE RIGHT SURGICAL PATHOLOGY EXAM Bella Chavez MD 4/4/2025 1213   4 : RIGHT MASTECTOMY SCAR Tissue BREAST SCAR RIGHT SURGICAL PATHOLOGY EXAM Bella Chavez MD 4/4/2025 1214       Implants:  Implants       Type Name Action Serial No.      Breast Implant BREAST IMPLANT, SILICONE, SM. ROUND HI PROFILE 500CC - [id number] - [id number] Implanted [id number]     Breast Implant BREAST IMPLANT, SILICONE, SM. ROUND HI PROFILE 500CC - [id number] - [id number] Implanted [id number]              Indications: Radha Tellez \"Tanya\" is an 54 y.o. female who is having surgery for Capsular contracture of breast " implant, sequela [T85.44XS]  Ruptured silicone breast implant, sequela [T85.43XS]  Breast asymmetry following reconstructive surgery [N65.1]  Personal history of breast cancer [Z85.3]. ***    The patient was seen in the preoperative area. The risks, benefits, complications, treatment options, non-operative alternatives, expected recovery and outcomes were discussed with the patient. The possibilities of reaction to medication, pulmonary aspiration, injury to surrounding structures, bleeding, recurrent infection, the need for additional procedures, failure to diagnose a condition, and creating a complication requiring transfusion or operation were discussed with the patient. The patient concurred with the proposed plan, giving informed consent.  The site of surgery was properly noted/marked if necessary per policy. The patient has been actively warmed in preoperative area. Preoperative antibiotics have been ordered and given within 1 hours of incision. Venous thrombosis prophylaxis have been ordered including bilateral sequential compression devices    Procedure Details: The patient was identified and marked in the upright and standing position.  She was taken to the operative room and placed in the supine position.  A preoperative time was performed identifying the patient, procedeure and laterality.  An atraumatic endotracheal intubation was performed by the anesthesia team.  Her arms were padded and carefully secured to the arm boards, abducted less than 90 degrees. She was prepped and draped in the usual sterile fashion.       37 cc of 0.5% Marcaine with epinephrine was diluted with 70 cc of normal saline and was used to place a chest wall blocks for post operative pain control. The incisions were dressed with mupirocin, xeroform, kerlix, ABD's, and a 6 inch ortho ACE wrap.    Complications:  None; patient tolerated the procedure well.    Disposition: PACU - hemodynamically stable.  Condition: stable        Attending Attestation: I performed the procedure.    Bella Chavez  Phone Number: [phone]       same procedure except for on this side the access incision was in her inferior pole.  It appeared to be what was once a inframammary fold incision but her implant pocket had previously bottomed out and it was approximately 3 cm superior from her current fold.  After both sides had their sizers placed and it I set the patient up once more and assess for symmetry.  She had effacement of her inframammary folds bilaterally.  Therefore I decided to perform capsulorrhaphy's to help the definition of her folds.  I performed this first on the right and then on the left side.  To do it on the right side I needed to extend her lateral incision by approximately 3 cm to have access to be able to place the sutures.  I performed the capsulorrhaphy's using interrupted 2-0 PDS figure-of-eight's.  I performed the capsulorrhaphy on the left side in a similar manner using her existing incision.  37 cc of 0.5% Marcaine with epinephrine was diluted with 70 cc of normal saline and was used to place a chest wall blocks for post operative pain control. I obtained hemostasis in both pockets and irrigated the sites.  I then instilled each pocket with a bottle of Irrisept and allowed to soak for 5 minutes.  I then prepped the surgical site with Irrisept and placed down sterile blue towels and changed my gloves.  The implants were [place] smooth round high-profile silicone implants 500 cc.  They were opened, bathed in Irrisept and placed into each pocket using a Suarez funnel.  I refreshed both of the incisions and excised her previous mastectomy scars.  I made sure that the closure on the right side address previous tethering that she had.  The incisions were closed with interrupted 3-0 Monocryl in the parenchyma followed by 3-0 Monocryl interrupted buried dermals and a running 4-0 Monocryl subcuticular. The incisions were dressed with mupirocin, xeroform, kerlix, ABD's, and a 6 inch ortho ACE wrap.  The patient tolerated the procedure well.   She was woken up, extubated, and taken to the recovery room in good condition.    Complications:  None; patient tolerated the procedure well.    Disposition: PACU - hemodynamically stable.  Condition: stable       Attending Attestation: I performed the procedure.    Bella Chavez  Phone Number: [phone]

## 2025-04-11 ENCOUNTER — TELEPHONE (OUTPATIENT)
Dept: PLASTIC SURGERY | Facility: CLINIC | Age: 55
End: 2025-04-11
Payer: COMMERCIAL

## 2025-04-11 NOTE — TELEPHONE ENCOUNTER
Pt sent message concerned with rash on area of ACE wrap. Pt advised to wash area around ACE where chloraprep is visible. She is also on day  7/14 of Bactrim. She is advised to stop taking Bactrim and call answering service if rash continues. We discussed the need to keep gauze and dressing on breasts. She may remove her ace wrap and apply surgical bra if nothing else is helping. She verbalized understanding of need to keep gauze in place and not allow anything to get wet. We will see her in clinic on 4/15.

## 2025-04-12 ENCOUNTER — TELEPHONE VISIT (OUTPATIENT)
Dept: PLASTIC SURGERY | Facility: HOSPITAL | Age: 55
End: 2025-04-12
Payer: COMMERCIAL

## 2025-04-12 DIAGNOSIS — G89.18 POST-OP PAIN: Primary | ICD-10-CM

## 2025-04-12 NOTE — PROGRESS NOTES
Patient called answering service this AM regarding worsening itchy rash to her torso since Thursday 4/10. Reached out to Danii Gutierres RN on 4/11, and it was recommended she stop Bactrim and wash areas of abdomen where chloraprep is visible. Yesterday, patient removed ACE wrap and applied surgical bra to better wash off the chloraprep, gauze still in place. Patient still with itchy rash, not relieved with Benadryl or topical anti-itch creams over past 24 hours. She otherwise denies any fevers, chills, nausea, vomiting, diarrhea, or constipation. Discussed with Dr. Chavez, recommended transitioning to Zyrtec and increasing dose to twice a day. Patient made aware and understood plan. Instructed to call answering service if rash progresses or she experiences any additional symptoms (increased pain, redness, fever, chills, SOB, CP). She is scheduled for follow-up on 4/15.     Ana Rios PA-C  Plastic and Reconstructive Surgery

## 2025-04-14 NOTE — PROGRESS NOTES
PLASTIC SURGERY CLINIC VISIT  POSTOP BREAST RECONSTRUCTION     Date: 4/15/25  Date of Surgery: 4/4/25  Surgical Procedure: Bilateral Implant Replacement        HPI:   Radha Tellez 54 y.o. female is here for post-operative appointment for the above procedure(s).      Interval changes as of this date:   4/15 Pt called reporting itching on trunk under ACE wrap. Pt stopped Bactrim after 7 days due to hives, removed ACE wrap and applied surgical bra leaving gauze in place. ACE wrap placed on top of surgical bra.  She began taking Zyrtec for itching. Pain was manageable.  The rash and hives continued to worsen, the Celebrex was self discontinued. She is using topical hydrocortisone cream.        MEDICATIONS    Current Outpatient Medications:     acetaminophen (Tylenol Extra Strength) 500 mg tablet, Take 2 tablets (1,000 mg) by mouth every 8 hours for 14 days., Disp: 84 tablet, Rfl: 0    ascorbic acid (Vitamin C) 1,000 mg tablet, Take 1 tablet (1,000 mg) by mouth once daily., Disp: , Rfl:     cholecalciferol (Vitamin D-3) 50 mcg (2,000 unit) capsule, Take 1 capsule (50 mcg) by mouth once daily., Disp: , Rfl:     estradiol (Estrace) 0.01 % (0.1 mg/gram) vaginal cream, Insert 0.5 Applicatorfuls (2 g) into the vagina 3 (three) times a week., Disp: 42.5 g, Rfl: 11    magnesium gluconate (Magonate) 27.5 mg magne- sium (500 mg) tablet, Take 1 tablet (27.5 mg) by mouth once daily., Disp: , Rfl:     omeprazole (PriLOSEC) 20 mg DR capsule, Take 1 daily before breakfast for 90 days.  Then stop and see if the reflux is still an issue or not.  Do not crush or chew., Disp: 90 capsule, Rfl: 3    venlafaxine XR (Effexor-XR) 75 mg 24 hr capsule, TAKE 1 CAPSULE ONCE DAILY WITH FOOD., Disp: 90 capsule, Rfl: 3    celecoxib (CeleBREX) 200 mg capsule, Take 1 capsule (200 mg) by mouth 2 times a day for 14 days. (Patient not taking: Reported on 4/15/2025), Disp: 28 capsule, Rfl: 0    gabapentin (Neurontin) 300 mg capsule, Take 1  capsule (300 mg) by mouth every 8 hours for 14 days. (Patient not taking: Reported on 4/15/2025), Disp: 42 capsule, Rfl: 0    methocarbamol (Robaxin) 500 mg tablet, Take 1 tablet (500 mg) by mouth every 6 hours if needed for muscle spasms for up to 10 days., Disp: 20 tablet, Rfl: 0      OBJECTIVE [x]Expand by Default  Blood pressure 100/68, pulse 92, temperature 36.7 °C (98.1 °F), temperature source Temporal, SpO2 97%.     REVIEW OF SYSTEMS:    Constitutional: negative for fevers, chills, unintentional weight loss  HEENT: negative for changes in vision, headaches, changes in hearing, congestion, sore throat  Cardiovascular: negative for chest pain, palpitations  Respiratory: negative for cough, shortness of breath  Gastrointestinal: negative for nausea, vomiting, diarrhea  Genitourinary: negative for dysuria, hematuria  Musculoskeletal: negative for joint swelling or pain  Skin: negative for rashes or lesions  Psych: negative for depression, anxiety  Endocrine: negative for polyuria, polydipsia, cold/heat intolerance  Hem/Lymph: negative for bleeding disorder     PHYSICAL EXAM  General: alert and oriented, no apparent distress    Focused exam of the breasts:  Right: C/D/I. Nipple in stable position.  + Fluid wave  Left: C/D/I. Nipple in stable position.  Smaller fluid wave    Aspiration of the right breast seroma occurred in the clinic today.  Site prepped with chloroprep and with implant displaced medially, seroma aspirated from lateral chest wall.  Approx 35 ml removed The patient had a vasovagal response after aspiration. She became diaphoretic with reduced vision. After giving her a cold compress and glass of ice water she recovered.     Steri-strip to be placed around her incisions. The patient will have a foam formed into an external splint. Afterwards she will have a snug bra without moving parts. She can have an ACE wrap placed over the bra.      ASSESSMENT/PLAN  Radha Tellez 54 y.o. female who had  Bilateral Implant Exchange on 4/4/25 who presents for POV.         I instructed the patient to undergo allergy testing for a sulfa allergy. As what she has described appears to be an allergy of sulfa medications.    Continue to restrict activity. She was instructed that she is able to shower at this stage in her recovery.   Continue to increase protein intake to promote healing.   I have advised her that she does not require another antibiotic after stopping the previous antibiotic due to allergy.   I aspirated the seroma, I would like to have Lydia follow up in 2 days. I will follow up the week after. She may require a referral to interventional radiology for ultrasound and drainage.   As she continues to have pain due to the positioning during surgery, I will prescribe the patient Robaxin. I have instructed the patient to take Tylenol and Aleve as directed. I am not concerned about her platelet count due to the time between her surgery and now.     RTC 4/17/25 with Christine Finn Attestation   By signing my name below, Dayo DUFFY Scribe attestation that this documentation has been prepared under the direction and in the presence of Bella Chavez MD.    Attending Attestation:  IBella MD, personal performed the history, exam, and decision making on this patient.

## 2025-04-15 ENCOUNTER — OFFICE VISIT (OUTPATIENT)
Dept: PLASTIC SURGERY | Facility: CLINIC | Age: 55
End: 2025-04-15
Payer: COMMERCIAL

## 2025-04-15 ENCOUNTER — APPOINTMENT (OUTPATIENT)
Dept: PRIMARY CARE | Facility: CLINIC | Age: 55
End: 2025-04-15
Payer: COMMERCIAL

## 2025-04-15 ENCOUNTER — PHARMACY VISIT (OUTPATIENT)
Dept: PHARMACY | Facility: CLINIC | Age: 55
End: 2025-04-15
Payer: COMMERCIAL

## 2025-04-15 VITALS
SYSTOLIC BLOOD PRESSURE: 100 MMHG | DIASTOLIC BLOOD PRESSURE: 68 MMHG | HEART RATE: 92 BPM | TEMPERATURE: 98.1 F | OXYGEN SATURATION: 97 %

## 2025-04-15 DIAGNOSIS — T85.43XD BREAST IMPLANT RUPTURE, SUBSEQUENT ENCOUNTER: ICD-10-CM

## 2025-04-15 DIAGNOSIS — N64.89 SEROMA OF BREAST: ICD-10-CM

## 2025-04-15 DIAGNOSIS — G89.18 POST-OP PAIN: Primary | ICD-10-CM

## 2025-04-15 LAB
LABORATORY COMMENT REPORT: NORMAL
PATH REPORT.FINAL DX SPEC: NORMAL
PATH REPORT.GROSS SPEC: NORMAL
PATH REPORT.RELEVANT HX SPEC: NORMAL
PATH REPORT.TOTAL CANCER: NORMAL

## 2025-04-15 PROCEDURE — 99211 OFF/OP EST MAY X REQ PHY/QHP: CPT | Performed by: SURGERY

## 2025-04-15 PROCEDURE — RXMED WILLOW AMBULATORY MEDICATION CHARGE

## 2025-04-15 RX ORDER — METHOCARBAMOL 500 MG/1
500 TABLET, FILM COATED ORAL EVERY 6 HOURS PRN
Qty: 20 TABLET | Refills: 0 | Status: SHIPPED | OUTPATIENT
Start: 2025-04-15 | End: 2025-04-25

## 2025-04-15 ASSESSMENT — PAIN SCALES - GENERAL: PAINLEVEL_OUTOF10: 4

## 2025-04-16 NOTE — PROGRESS NOTES
PLASTIC SURGERY CLINIC VISIT  POSTOP BREAST RECONSTRUCTION     Date: 4/17/25  Date of Surgery: 4/4/25  Surgical Procedure: Bilateral Implant Replacement        HPI:   Radha Tellez 54 y.o. female is here for post-operative appointment for the above procedure(s).      Interval changes as of this date:   4/15 Pt called reporting itching on trunk under ACE wrap. Pt stopped Bactrim after 7 days due to hives, removed ACE wrap and applied surgical bra leaving gauze in place. ACE wrap placed on top of surgical bra.  She began taking Zyrtec for itching. Pain was manageable.  The rash and hives continued to worsen, the Celebrex was self discontinued. She is using topical hydrocortisone cream.   4/17 Here today for seroma evaluation. Rash continues to improve. Pain controlled also improved on robaxin. Continues to follow activity restrictions.     MEDICATIONS    Current Outpatient Medications:     acetaminophen (Tylenol Extra Strength) 500 mg tablet, Take 2 tablets (1,000 mg) by mouth every 8 hours for 14 days., Disp: 84 tablet, Rfl: 0    ascorbic acid (Vitamin C) 1,000 mg tablet, Take 1 tablet (1,000 mg) by mouth once daily., Disp: , Rfl:     celecoxib (CeleBREX) 200 mg capsule, Take 1 capsule (200 mg) by mouth 2 times a day for 14 days. (Patient not taking: Reported on 4/15/2025), Disp: 28 capsule, Rfl: 0    cholecalciferol (Vitamin D-3) 50 mcg (2,000 unit) capsule, Take 1 capsule (50 mcg) by mouth once daily., Disp: , Rfl:     estradiol (Estrace) 0.01 % (0.1 mg/gram) vaginal cream, Insert 0.5 Applicatorfuls (2 g) into the vagina 3 (three) times a week., Disp: 42.5 g, Rfl: 11    gabapentin (Neurontin) 300 mg capsule, Take 1 capsule (300 mg) by mouth every 8 hours for 14 days. (Patient not taking: Reported on 4/15/2025), Disp: 42 capsule, Rfl: 0    magnesium gluconate (Magonate) 27.5 mg magne- sium (500 mg) tablet, Take 1 tablet (27.5 mg) by mouth once daily., Disp: , Rfl:     methocarbamol (Robaxin) 500 mg tablet,  "Take 1 tablet (500 mg) by mouth every 6 hours if needed for muscle spasms for up to 10 days., Disp: 20 tablet, Rfl: 0    omeprazole (PriLOSEC) 20 mg DR capsule, Take 1 daily before breakfast for 90 days.  Then stop and see if the reflux is still an issue or not.  Do not crush or chew., Disp: 90 capsule, Rfl: 3    venlafaxine XR (Effexor-XR) 75 mg 24 hr capsule, TAKE 1 CAPSULE ONCE DAILY WITH FOOD., Disp: 90 capsule, Rfl: 3      OBJECTIVE [x]Expand by Default  Blood pressure 96/65, pulse 73, height 1.727 m (5' 8\"), weight 66.8 kg (147 lb 3.2 oz).       REVIEW OF SYSTEMS:    Constitutional: negative for fevers, chills, unintentional weight loss  HEENT: negative for changes in vision, headaches, changes in hearing, congestion, sore throat  Cardiovascular: negative for chest pain, palpitations  Respiratory: negative for cough, shortness of breath  Gastrointestinal: negative for nausea, vomiting, diarrhea  Genitourinary: negative for dysuria, hematuria  Musculoskeletal: negative for joint swelling or pain  Skin: negative for rashes or lesions  Psych: negative for depression, anxiety  Endocrine: negative for polyuria, polydipsia, cold/heat intolerance  Hem/Lymph: negative for bleeding disorder     PHYSICAL EXAM  General: alert and oriented, no apparent distress    Focused exam of the breasts:  Right: C/D/I. Nipple in stable position. Concern for fluid collection superior and lateral to implant.   Left: C/D/I. Nipple in stable position. Concern for fluid collection superior and lateral to implant.      ASSESSMENT/PLAN  Radha Tellez 54 y.o. female who had Bilateral Implant Exchange on 4/4/25 who presents for POV.    Improvement in pain control and rash since visit on 4/15. Discontinued bactrim and celebrex. Tolerating robaxin. On exam, there is still concern for possible seroma of bilateral breasts. No erythema, warmth or tenderness concerning for infection. Will send to [facility] breast [facility] for diagnostic ultrasound +/- " US guided drainage of fluid collections.     Discussed with radiology at [facility]. Planning to see pt 4/18 am. Will follow results of procedure.    Scheduled for follow-up with Dr. Chavez on 4/29. In the interim, continue activity restrictions and increased protein intake.     Gilma Trivedi PA-C

## 2025-04-17 ENCOUNTER — TELEPHONE (OUTPATIENT)
Dept: RADIOLOGY | Facility: CLINIC | Age: 55
End: 2025-04-17

## 2025-04-17 ENCOUNTER — OFFICE VISIT (OUTPATIENT)
Dept: PLASTIC SURGERY | Facility: CLINIC | Age: 55
End: 2025-04-17
Payer: COMMERCIAL

## 2025-04-17 VITALS
HEIGHT: 68 IN | SYSTOLIC BLOOD PRESSURE: 96 MMHG | WEIGHT: 147.2 LBS | HEART RATE: 73 BPM | DIASTOLIC BLOOD PRESSURE: 65 MMHG | BODY MASS INDEX: 22.31 KG/M2

## 2025-04-17 DIAGNOSIS — N64.89 SEROMA OF BREAST: ICD-10-CM

## 2025-04-17 DIAGNOSIS — Z85.3 PERSONAL HISTORY OF BREAST CANCER: ICD-10-CM

## 2025-04-17 DIAGNOSIS — T85.43XD BREAST IMPLANT RUPTURE, SUBSEQUENT ENCOUNTER: Primary | ICD-10-CM

## 2025-04-17 DIAGNOSIS — N65.1 BREAST ASYMMETRY FOLLOWING RECONSTRUCTIVE SURGERY: ICD-10-CM

## 2025-04-18 ENCOUNTER — TELEPHONE (OUTPATIENT)
Dept: RADIOLOGY | Facility: HOSPITAL | Age: 55
End: 2025-04-18
Payer: COMMERCIAL

## 2025-04-18 NOTE — TELEPHONE ENCOUNTER
Called patient back.  She is scheduled for Monday at [facility] for imaging followed by the aspiration.  She is aware to arrive at 8:45am.  She has been there before and is familiar with where to park and where to check in.  Reviewed pre procedure instructions with patient.  Patient verbalizes understanding of plan.  Nikki Aguirre RN

## 2025-04-18 NOTE — TELEPHONE ENCOUNTER
Spoke with pt about date, time, location and parking, aware it is okay to eat drink and drive as long as not using sedating medication  or having another appointment  with different instructions,  reviewed pre, post and discharge instructions, instructed to wear most supportive bra, tylenol  for soreness, given nurse line number, verbalizing understanding no questions. New allergy to Bactrim, on Ibuprofen, okay with lidocaine, patient waiting to hear from Dr. Peck to see if she can wait  con getting areas aspirated, to call with plan

## 2025-04-21 ENCOUNTER — HOSPITAL ENCOUNTER (OUTPATIENT)
Dept: RADIOLOGY | Facility: HOSPITAL | Age: 55
Discharge: HOME | End: 2025-04-21
Payer: COMMERCIAL

## 2025-04-21 DIAGNOSIS — T85.43XD BREAST IMPLANT RUPTURE, SUBSEQUENT ENCOUNTER: ICD-10-CM

## 2025-04-21 DIAGNOSIS — Z85.3 PERSONAL HISTORY OF BREAST CANCER: ICD-10-CM

## 2025-04-21 DIAGNOSIS — N65.1 BREAST ASYMMETRY FOLLOWING RECONSTRUCTIVE SURGERY: ICD-10-CM

## 2025-04-21 PROCEDURE — 76642 ULTRASOUND BREAST LIMITED: CPT

## 2025-04-21 PROCEDURE — 19000 PUNCTURE ASPIR CYST BREAST: CPT | Mod: LT

## 2025-04-21 PROCEDURE — 76983 USE EA ADDL TARGET LESION: CPT

## 2025-04-21 PROCEDURE — 19000 PUNCTURE ASPIR CYST BREAST: CPT | Mod: RT

## 2025-04-21 PROCEDURE — 2500000004 HC RX 250 GENERAL PHARMACY W/ HCPCS (ALT 636 FOR OP/ED): Mod: JZ | Performed by: STUDENT IN AN ORGANIZED HEALTH CARE EDUCATION/TRAINING PROGRAM

## 2025-04-21 RX ADMIN — Medication 18 ML: at 10:04

## 2025-04-21 ASSESSMENT — PAIN - FUNCTIONAL ASSESSMENT: PAIN_FUNCTIONAL_ASSESSMENT: 0-10

## 2025-04-21 ASSESSMENT — PAIN SCALES - GENERAL
PAINLEVEL_OUTOF10: 0 - NO PAIN
PAINLEVEL_OUTOF10: 4

## 2025-04-21 NOTE — Clinical Note
Patient tolerated procedure well. Bilateral seroma aspirations:  patient had no discomfort:    Right breast 123 ml red serous clear fluid;   9 ml lidocaine given per Dr. Alva at 1004:  Left breast 98 ml red serous clear fluid:  9 ml lidocaine given per Dr. Alva at 1021:     Debrief at  1027           Aromatherapy given: pressure held x 2-3 minutes each site: wound dressings placed and reviewed with the patient:   The patient was  instructed to keep in touch with Dr. Peck.

## 2025-04-25 NOTE — PROGRESS NOTES
PLASTIC SURGERY CLINIC VISIT  POSTOP BREAST RECONSTRUCTION     Date: 4/17/25  Date of Surgery: 4/4/25  Surgical Procedure: Bilateral Implant Replacement        HPI:   Radha Tellez 54 y.o. female is here for post-operative appointment for the above procedure(s).      Interval changes as of this date:   4/15 Pt called reporting itching on trunk under ACE wrap. Pt stopped Bactrim after 7 days due to hives, removed ACE wrap and applied surgical bra leaving gauze in place. ACE wrap placed on top of surgical bra.  She began taking Zyrtec for itching. Pain was manageable.  The rash and hives continued to worsen, the Celebrex was self discontinued. She is using topical hydrocortisone cream.   4/17 Here today for seroma evaluation. Rash continues to improve. Pain controlled also improved on robaxin. Continues to follow activity restrictions.  4/29 Doing well overall. Was seen by IR on 4/21 for aspiration on 123 ml on the right breast and 98 ml of the left breast. She reports mild discomfort and internal pulling sensations, particularly on the right side when lifting the arm or waking from sleep on that side. The pain feels like muscle tension pulling at the ribs. She also notes mild scabbing at the nipple but is otherwise satisfied with the shape and overall improvement in breast contour. She has been cautious with arm movement due to tenderness, especially around the internal sutures. No signs of fluid collection or infection were noted. She also expressed some tightness in the lower breast region, where she lacks sensation, and minor asymmetry with one nipple sitting slightly higher than the other.    MEDICATIONS    Current Outpatient Medications:     ascorbic acid (Vitamin C) 1,000 mg tablet, Take 1 tablet (1,000 mg) by mouth once daily., Disp: , Rfl:     cholecalciferol (Vitamin D-3) 50 mcg (2,000 unit) capsule, Take 1 capsule (50 mcg) by mouth once daily., Disp: , Rfl:     estradiol (Estrace) 0.01 % (0.1  mg/gram) vaginal cream, Insert 0.5 Applicatorfuls (2 g) into the vagina 3 (three) times a week., Disp: 42.5 g, Rfl: 11    magnesium gluconate (Magonate) 27.5 mg magne- sium (500 mg) tablet, Take 1 tablet (27.5 mg) by mouth once daily., Disp: , Rfl:     venlafaxine XR (Effexor-XR) 75 mg 24 hr capsule, TAKE 1 CAPSULE ONCE DAILY WITH FOOD., Disp: 90 capsule, Rfl: 3    gabapentin (Neurontin) 300 mg capsule, Take 1 capsule (300 mg) by mouth every 8 hours for 14 days. (Patient not taking: Reported on 4/15/2025), Disp: 42 capsule, Rfl: 0    methocarbamol (Robaxin) 500 mg tablet, Take 1 tablet (500 mg) by mouth every 6 hours if needed for muscle spasms for up to 10 days., Disp: 20 tablet, Rfl: 0    omeprazole (PriLOSEC) 20 mg DR capsule, Take 1 daily before breakfast for 90 days.  Then stop and see if the reflux is still an issue or not.  Do not crush or chew. (Patient not taking: Reported on 4/29/2025), Disp: 90 capsule, Rfl: 3      OBJECTIVE [x]Expand by Default  Blood pressure 86/52, pulse 79, temperature 36.7 °C (98 °F), temperature source Temporal, SpO2 96%.       REVIEW OF SYSTEMS:    Constitutional: negative for fevers, chills, unintentional weight loss  HEENT: negative for changes in vision, headaches, changes in hearing, congestion, sore throat  Cardiovascular: negative for chest pain, palpitations  Respiratory: negative for cough, shortness of breath  Gastrointestinal: negative for nausea, vomiting, diarrhea  Genitourinary: negative for dysuria, hematuria  Musculoskeletal: negative for joint swelling or pain  Skin: negative for rashes or lesions  Psych: negative for depression, anxiety  Endocrine: negative for polyuria, polydipsia, cold/heat intolerance  Hem/Lymph: negative for bleeding disorder     PHYSICAL EXAM  General: alert and oriented, no apparent distress    Focused exam of the breasts:  Right: C/D/I. Nipple in stable position.   Left: C/D/I. Nipple in stable position.     Mild scabbing at the nipple,  expected healing noted.  Some tightness and tenderness near internal sutures of the capsulorrhaphy on the left  No evidence of seroma or fluid collection.  Mild asymmetry and tethering improving.  Internal sutures palpated; tissue manipulation well tolerated.    ASSESSMENT/PLAN  Radha Tellez 54 y.o. female who had Bilateral Implant Exchange on 4/4/25 who presents for POV.    Continue with activity restrictions   Continue with increased protein intake   Continue massaging the breasts.  Wound Care: Apply steri-strips to area of scabbing for support; no ointment needed at this stage. Begin gentle stretching to maintain mobility without straining the surgical site.   Start with scapular retraction exercises (pivoting scapula toward spine) without arm elevation to activate and stretch surrounding musculature safely.  Encourage gentle forearm movements to increase mobility while protecting the surgical site.  Avoid aggressive upper body movement.  Will reassess for symmetry and scar maturation in subsequent visits.    Attending Attestation:  Bella DUFFY MD, personal performed the history, exam, and decision making on this patient.      RTC 2 weeks    Scribe Attestation  By signing my name below, Dayna DUFFY , [employer]   attest that this documentation has been prepared under the direction and in the presence of Bella Chavez MD.

## 2025-04-29 ENCOUNTER — OFFICE VISIT (OUTPATIENT)
Dept: PLASTIC SURGERY | Facility: CLINIC | Age: 55
End: 2025-04-29
Payer: COMMERCIAL

## 2025-04-29 VITALS
TEMPERATURE: 98 F | OXYGEN SATURATION: 96 % | SYSTOLIC BLOOD PRESSURE: 86 MMHG | HEART RATE: 79 BPM | DIASTOLIC BLOOD PRESSURE: 52 MMHG

## 2025-04-29 DIAGNOSIS — N65.1 BREAST ASYMMETRY FOLLOWING RECONSTRUCTIVE SURGERY: ICD-10-CM

## 2025-04-29 DIAGNOSIS — T85.43XD BREAST IMPLANT RUPTURE, SUBSEQUENT ENCOUNTER: Primary | ICD-10-CM

## 2025-04-29 PROCEDURE — 99211 OFF/OP EST MAY X REQ PHY/QHP: CPT | Performed by: SURGERY

## 2025-04-29 ASSESSMENT — PAIN SCALES - GENERAL: PAINLEVEL_OUTOF10: 0-NO PAIN

## 2025-05-10 NOTE — PROGRESS NOTES
PLASTIC SURGERY CLINIC VISIT  POSTOP BREAST RECONSTRUCTION     Date: 5/13/25  Date of Surgery: 4/4/25  Surgical Procedure: Bilateral Implant Replacement        HPI:   Radha Tellez 54 y.o. female is here for post-operative appointment for the above procedure(s).      Interval changes as of this date:   4/15 Pt called reporting itching on trunk under ACE wrap. Pt stopped Bactrim after 7 days due to hives, removed ACE wrap and applied surgical bra leaving gauze in place. ACE wrap placed on top of surgical bra.  She began taking Zyrtec for itching. Pain was manageable.  The rash and hives continued to worsen, the Celebrex was self discontinued. She is using topical hydrocortisone cream.   4/17 Here today for seroma evaluation. Rash continues to improve. Pain controlled also improved on robaxin. Continues to follow activity restrictions.  4/29 Doing well overall. Was seen by IR on 4/21 for aspiration on 123 ml on the right breast and 98 ml of the left breast. She reports mild discomfort and internal pulling sensations, particularly on the right side when lifting the arm or waking from sleep on that side. The pain feels like muscle tension pulling at the ribs. She also notes mild scabbing at the nipple but is otherwise satisfied with the shape and overall improvement in breast contour. She has been cautious with arm movement due to tenderness, especially around the internal sutures. No signs of fluid collection or infection were noted. She also expressed some tightness in the lower breast region, where she lacks sensation, and minor asymmetry with one nipple sitting slightly higher than the other.  5/13 Doing well overall. Her ROM is improving. She feels good with stretching. She is taking Ibuprofen. She does not involve in strenuous activities. Her activity includes putting away dishes.      MEDICATIONS    Current Outpatient Medications:     ascorbic acid (Vitamin C) 1,000 mg tablet, Take 1 tablet (1,000 mg)  "by mouth once daily., Disp: , Rfl:     cholecalciferol (Vitamin D-3) 50 mcg (2,000 unit) capsule, Take 1 capsule (50 mcg) by mouth once daily., Disp: , Rfl:     estradiol (Estrace) 0.01 % (0.1 mg/gram) vaginal cream, Insert 0.5 Applicatorfuls (2 g) into the vagina 3 (three) times a week., Disp: 42.5 g, Rfl: 11    gabapentin (Neurontin) 300 mg capsule, Take 1 capsule (300 mg) by mouth every 8 hours for 14 days. (Patient not taking: Reported on 4/15/2025), Disp: 42 capsule, Rfl: 0    magnesium gluconate (Magonate) 27.5 mg magne- sium (500 mg) tablet, Take 1 tablet (27.5 mg) by mouth once daily., Disp: , Rfl:     methocarbamol (Robaxin) 500 mg tablet, Take 1 tablet (500 mg) by mouth every 6 hours if needed for muscle spasms for up to 10 days., Disp: 20 tablet, Rfl: 0    omeprazole (PriLOSEC) 20 mg DR capsule, Take 1 daily before breakfast for 90 days.  Then stop and see if the reflux is still an issue or not.  Do not crush or chew. (Patient not taking: Reported on 4/29/2025), Disp: 90 capsule, Rfl: 3    venlafaxine XR (Effexor-XR) 75 mg 24 hr capsule, TAKE 1 CAPSULE ONCE DAILY WITH FOOD., Disp: 90 capsule, Rfl: 3      OBJECTIVE [x]Expand by Default  Blood pressure 95/64, pulse 78, height 1.727 m (5' 8\"), weight 64.8 kg (142 lb 12.8 oz).       REVIEW OF SYSTEMS:    Constitutional: negative for fevers, chills, unintentional weight loss  HEENT: negative for changes in vision, headaches, changes in hearing, congestion, sore throat  Cardiovascular: negative for chest pain, palpitations  Respiratory: negative for cough, shortness of breath  Gastrointestinal: negative for nausea, vomiting, diarrhea  Genitourinary: negative for dysuria, hematuria  Musculoskeletal: negative for joint swelling or pain  Skin: negative for rashes or lesions  Psych: negative for depression, anxiety  Endocrine: negative for polyuria, polydipsia, cold/heat intolerance  Hem/Lymph: negative for bleeding disorder     PHYSICAL EXAM  General: alert and " oriented, no apparent distress    Focused exam of the breasts:  Right: C/D/I. Nipple in stable position.   Left: C/D/I. Nipple in stable position.       ASSESSMENT/PLAN  Radha Tellez 54 y.o. female who had Bilateral Implant Exchange on 4/4/25 who presents for POV.    Continue with activity restrictions   Continue with increased protein intake   Continue massaging the breasts.  Candidate for Fat grafting; discussed the procedure in detail with the patient along with the risks and benefits.  Provided education regarding post-operative care, including infection prevention related to pet contact during recovery, high protein diet, activity/exercise restriction.  Surgical date for 2026 -- April 2th with preop approx 4 weeks before to see if any adjustments need to be made to the case and Telehealth in Jan 2026 to make sure timing still works.        Scribe Attestation  By signing my name below, Tegan DUFFY, Scribe   attest that this documentation has been prepared under the direction and in the presence of Bella Chavez MD.    Attending Attestation:  Bella DUFFY MD, personal performed the history, exam, and decision making on this patient.

## 2025-05-13 ENCOUNTER — APPOINTMENT (OUTPATIENT)
Dept: PLASTIC SURGERY | Facility: CLINIC | Age: 55
End: 2025-05-13
Payer: COMMERCIAL

## 2025-05-13 VITALS
HEART RATE: 78 BPM | BODY MASS INDEX: 21.64 KG/M2 | SYSTOLIC BLOOD PRESSURE: 95 MMHG | DIASTOLIC BLOOD PRESSURE: 64 MMHG | WEIGHT: 142.8 LBS | HEIGHT: 68 IN

## 2025-05-13 DIAGNOSIS — Z85.3 HISTORY OF BREAST CANCER: Primary | ICD-10-CM

## 2025-05-13 DIAGNOSIS — N65.1 BREAST ASYMMETRY FOLLOWING RECONSTRUCTIVE SURGERY: ICD-10-CM

## 2025-05-13 DIAGNOSIS — T85.43XD BREAST IMPLANT RUPTURE, SUBSEQUENT ENCOUNTER: ICD-10-CM

## 2025-05-13 RX ORDER — CEFAZOLIN SODIUM 2 G/100ML
2 INJECTION, SOLUTION INTRAVENOUS ONCE
OUTPATIENT
Start: 2025-05-13 | End: 2025-05-13

## 2025-05-13 RX ORDER — APREPITANT 40 MG/1
40 CAPSULE ORAL ONCE
OUTPATIENT
Start: 2025-05-13 | End: 2025-05-13

## 2025-05-13 RX ORDER — GABAPENTIN 300 MG/1
600 CAPSULE ORAL ONCE
OUTPATIENT
Start: 2025-05-13 | End: 2025-05-13

## 2025-05-13 RX ORDER — ACETAMINOPHEN 325 MG/1
975 TABLET ORAL ONCE
OUTPATIENT
Start: 2025-05-13 | End: 2025-05-13

## 2025-05-16 PROCEDURE — RXMED WILLOW AMBULATORY MEDICATION CHARGE

## 2025-05-20 ENCOUNTER — PHARMACY VISIT (OUTPATIENT)
Dept: PHARMACY | Facility: CLINIC | Age: 55
End: 2025-05-20
Payer: COMMERCIAL

## 2025-10-31 ENCOUNTER — APPOINTMENT (OUTPATIENT)
Dept: PRIMARY CARE | Facility: CLINIC | Age: 55
End: 2025-10-31
Payer: COMMERCIAL

## 2026-01-13 ENCOUNTER — APPOINTMENT (OUTPATIENT)
Dept: PLASTIC SURGERY | Facility: CLINIC | Age: 56
End: 2026-01-13
Payer: COMMERCIAL

## 2026-02-24 ENCOUNTER — APPOINTMENT (OUTPATIENT)
Dept: PLASTIC SURGERY | Facility: CLINIC | Age: 56
End: 2026-02-24
Payer: COMMERCIAL

## (undated) DEVICE — Device

## (undated) DEVICE — BAG, DECANTER

## (undated) DEVICE — DRESSING, ABDOMINAL, TENDERSORB, 8 X 7-1/2 IN, STERILE

## (undated) DEVICE — ELECTRODE, ELECTROSURGICAL, BLADE, INSULATED, ENT/IMA, STERILE

## (undated) DEVICE — ELECTRODE, ELECTROSURGICAL, BLADE EXT 4 INCH, INSULATED

## (undated) DEVICE — WOUND CARE, MEPITEL ONE, 3 X 4, 7.5 X 10CM

## (undated) DEVICE — MANIFOLD, 4 PORT NEPTUNE STANDARD

## (undated) DEVICE — DRESSING, GAUZE, PETROLATUM, XEROFORM, 5 X 9 IN, STERILE

## (undated) DEVICE — DRESSING, GAUZE, SPONGE, 12 PLY, CURITY, 4 X 4 IN, RIGID TRAY, STERILE

## (undated) DEVICE — RETRACTOR, CORDLESS, RADIALUX, LIGHTED

## (undated) DEVICE — APPLICATOR, CHLORAPREP, W/ORANGE TINT, 26ML

## (undated) DEVICE — COVER, CART, 45 X 27 X 48 IN, CLEAR

## (undated) DEVICE — NEEDLE, HYPODERMIC, 25 G X 2 IN

## (undated) DEVICE — SYRINGE, 60 CC, LUER LOCK, MONOJECT

## (undated) DEVICE — DRESSING, TRANSPARENT, TEGADERM, 4 X 4-3/4 IN, NO LABEL

## (undated) DEVICE — WOUND SYSTEM, DEBRIDEMENT & CLEANING, O.R DUOPAK

## (undated) DEVICE — SYRINGE, 60 CC, IRRIGATION, BULB, CONTRO-BULB, PAPER POUCH

## (undated) DEVICE — STRIP, SKIN CLOSURE, STERI STRIP, REINFORCED, 0.5 X 4 IN

## (undated) DEVICE — TOWEL, SURGICAL, NEURO, O/R, 16 X 26, BLUE, STERILE

## (undated) DEVICE — STAPLER, SKIN PROXIMATE, 35 WIDE

## (undated) DEVICE — SPONGE, LAP, XRAY DECT, 18IN X 18IN, W/LOOP, STERILE

## (undated) DEVICE — DRESSING, GAUZE, SUPER KERLIX, 6X6

## (undated) DEVICE — MARKER, SKIN, DUAL TIP, W/RULER AND LABEL

## (undated) DEVICE — CAUTERY, PENCIL, PUSH BUTTON, SMOKE EVAC, 70MM

## (undated) DEVICE — DRAPE, INCISE, ANTIMICROBIAL, IOBAN 2, 10.5 X 8 IN

## (undated) DEVICE — BLADE, CARBON STEEL, 10, STERILE, DISP